# Patient Record
Sex: MALE | Race: WHITE | NOT HISPANIC OR LATINO | Employment: FULL TIME | URBAN - METROPOLITAN AREA
[De-identification: names, ages, dates, MRNs, and addresses within clinical notes are randomized per-mention and may not be internally consistent; named-entity substitution may affect disease eponyms.]

---

## 2017-05-15 ENCOUNTER — HOSPITAL ENCOUNTER (EMERGENCY)
Facility: CLINIC | Age: 22
Discharge: HOME | End: 2017-05-15
Attending: EMERGENCY MEDICINE | Admitting: EMERGENCY MEDICINE
Payer: MEDICAID

## 2017-05-15 VITALS
RESPIRATION RATE: 15 BRPM | SYSTOLIC BLOOD PRESSURE: 114 MMHG | DIASTOLIC BLOOD PRESSURE: 66 MMHG | WEIGHT: 175 LBS | TEMPERATURE: 98.8 F | OXYGEN SATURATION: 97 %

## 2017-05-15 DIAGNOSIS — E86.0 DEHYDRATION: ICD-10-CM

## 2017-05-15 DIAGNOSIS — R00.1 SINUS BRADYCARDIA: ICD-10-CM

## 2017-05-15 DIAGNOSIS — R55 SYNCOPE, UNSPECIFIED SYNCOPE TYPE: ICD-10-CM

## 2017-05-15 LAB
ANION GAP SERPL CALCULATED.3IONS-SCNC: 7 MMOL/L (ref 3–14)
BASOPHILS # BLD AUTO: 0 10E9/L (ref 0–0.2)
BASOPHILS NFR BLD AUTO: 0 %
BUN SERPL-MCNC: 11 MG/DL (ref 7–30)
CALCIUM SERPL-MCNC: 8.9 MG/DL (ref 8.5–10.1)
CHLORIDE SERPL-SCNC: 108 MMOL/L (ref 94–109)
CO2 SERPL-SCNC: 27 MMOL/L (ref 20–32)
CREAT SERPL-MCNC: 0.85 MG/DL (ref 0.66–1.25)
DIFFERENTIAL METHOD BLD: NORMAL
EOSINOPHIL # BLD AUTO: 0.1 10E9/L (ref 0–0.7)
EOSINOPHIL NFR BLD AUTO: 1 %
ERYTHROCYTE [DISTWIDTH] IN BLOOD BY AUTOMATED COUNT: 13.4 % (ref 10–15)
GFR SERPL CREATININE-BSD FRML MDRD: NORMAL ML/MIN/1.7M2
GLUCOSE SERPL-MCNC: 92 MG/DL (ref 70–99)
HCT VFR BLD AUTO: 45.7 % (ref 40–53)
HGB BLD-MCNC: 15.6 G/DL (ref 13.3–17.7)
LYMPHOCYTES # BLD AUTO: 2.1 10E9/L (ref 0.8–5.3)
LYMPHOCYTES NFR BLD AUTO: 38 %
MCH RBC QN AUTO: 28.9 PG (ref 26.5–33)
MCHC RBC AUTO-ENTMCNC: 34.1 G/DL (ref 31.5–36.5)
MCV RBC AUTO: 85 FL (ref 78–100)
MONOCYTES # BLD AUTO: 0.7 10E9/L (ref 0–1.3)
MONOCYTES NFR BLD AUTO: 13 %
NEUTROPHILS # BLD AUTO: 2.6 10E9/L (ref 1.6–8.3)
NEUTROPHILS NFR BLD AUTO: 48 %
PLATELET # BLD AUTO: 195 10E9/L (ref 150–450)
POTASSIUM SERPL-SCNC: 4 MMOL/L (ref 3.4–5.3)
RBC # BLD AUTO: 5.39 10E12/L (ref 4.4–5.9)
SODIUM SERPL-SCNC: 142 MMOL/L (ref 133–144)
WBC # BLD AUTO: 5.4 10E9/L (ref 4–11)

## 2017-05-15 PROCEDURE — 80048 BASIC METABOLIC PNL TOTAL CA: CPT | Performed by: EMERGENCY MEDICINE

## 2017-05-15 PROCEDURE — 96360 HYDRATION IV INFUSION INIT: CPT

## 2017-05-15 PROCEDURE — 96361 HYDRATE IV INFUSION ADD-ON: CPT

## 2017-05-15 PROCEDURE — 25000128 H RX IP 250 OP 636: Performed by: EMERGENCY MEDICINE

## 2017-05-15 PROCEDURE — 99284 EMERGENCY DEPT VISIT MOD MDM: CPT | Mod: 25 | Performed by: EMERGENCY MEDICINE

## 2017-05-15 PROCEDURE — 99284 EMERGENCY DEPT VISIT MOD MDM: CPT | Mod: 25

## 2017-05-15 PROCEDURE — 93010 ELECTROCARDIOGRAM REPORT: CPT | Performed by: EMERGENCY MEDICINE

## 2017-05-15 PROCEDURE — 93005 ELECTROCARDIOGRAM TRACING: CPT

## 2017-05-15 PROCEDURE — 85025 COMPLETE CBC W/AUTO DIFF WBC: CPT | Performed by: EMERGENCY MEDICINE

## 2017-05-15 RX ORDER — SODIUM CHLORIDE 9 MG/ML
1000 INJECTION, SOLUTION INTRAVENOUS CONTINUOUS
Status: DISCONTINUED | OUTPATIENT
Start: 2017-05-15 | End: 2017-05-15 | Stop reason: HOSPADM

## 2017-05-15 RX ADMIN — SODIUM CHLORIDE 1500 ML: 9 INJECTION, SOLUTION INTRAVENOUS at 10:44

## 2017-05-15 ASSESSMENT — ENCOUNTER SYMPTOMS: LIGHT-HEADEDNESS: 1

## 2017-05-15 NOTE — DISCHARGE INSTRUCTIONS
Encourage rehydration with water and Gatorade  Call to set up outpatient cardiac monitoring.  Order has been placed for 24-hour Holter monitor  Appointment has been scheduled Prabhu Rodriguez MD-Wednesday, May 24 7:20 AM-[facility].

## 2017-05-15 NOTE — ED AVS SNAPSHOT
[facility]    [address] [address]    Phone:  [phone]    Fax:  [phone]                                       Chet Byrd   MRN: [id number]    Department:  [facility]   Date of Visit:  5/15/2017           Patient Information     Date Of Birth          1995        Your diagnoses for this visit were:     Syncope, unspecified syncope type     Dehydration     Sinus bradycardia        You were seen by Antonio Hernandez DO.      Follow-up Information     Follow up with Prabhu Rodriguez MD.    Specialty:  Family Practice    Contact information:    [facility]  [address] [address]  [phone]          Discharge Instructions       Encourage rehydration with water and Gatorade  Call to set up outpatient cardiac monitoring.  Order has been placed for 24-hour Holter monitor  Appointment has been scheduled Prabhu Rodriguez MD-Wednesday, May 24 7:20 AM-[facility] family medicine clinic.    Future Appointments        Provider Department Dept Phone Center    5/17/2017 9:00 AM [place] [facility] Services [phone] [facility]    5/24/2017 7:20 AM Prabhu Rodriguez MD [facility] [phone] [facility]      24 Hour Appointment Hotline       To make an appointment at any [facility], call [phone] ([phone]). If you don't have a family doctor or clinic, we will help you find one. [facility] are conveniently located to serve the needs of you and your family.          ED Discharge Orders     Holter Monitor 24 hour - Adult           Holter Monitor 24 hour - Adult           Holter Monitor 24 hour - Adult                    Review of your medicines      Notice     You have not been prescribed any medications.            Procedures and tests performed during your visit     Basic metabolic panel    CBC with platelets differential    Cardiac  Continuous Monitoring    EKG 12-lead, tracing only    Glucose monitor nursing POCT    Orthostatic blood pressure and pulse    Pulse oximetry nursing      Orders Needing Specimen Collection     Ordered          05/15/17 1007  Routine UA with microscopic - ROUTINE, Prio: Routine, Needs to be Collected     Scheduled Task Status   05/15/17 1007 Collect Routine UA with microscopic Open   Order Class:  PCU Collect                  Pending Results     No orders found from 5/13/2017 to 5/16/2017.            Pending Culture Results     No orders found from 5/13/2017 to 5/16/2017.            Pending Results Instructions     If you had any lab results that were not finalized at the time of your Discharge, you can call the ED Lab Result RN at [phone]. You will be contacted by this team for any positive Lab results or changes in treatment. The nurses are available 7 days a week from 10A to 6:30P.  You can leave a message 24 hours per day and they will return your call.        Test Results From Your Hospital Stay        5/15/2017 12:10 PM      Component Results     Component Value Ref Range & Units Status    WBC 5.4 4.0 - 11.0 10e9/L Final    RBC Count 5.39 4.4 - 5.9 10e12/L Final    Hemoglobin 15.6 13.3 - 17.7 g/dL Final    Hematocrit 45.7 40.0 - 53.0 % Final    MCV 85 78 - 100 fl Final    MCH 28.9 26.5 - 33.0 pg Final    MCHC 34.1 31.5 - 36.5 g/dL Final    RDW 13.4 10.0 - 15.0 % Final    Platelet Count 195 150 - 450 10e9/L Final    Diff Method Manual Differential  Final    % Neutrophils 48.0 % Final    % Lymphocytes 38.0 % Final    % Monocytes 13.0 % Final    % Eosinophils 1.0 % Final    % Basophils 0.0 % Final    Absolute Neutrophil 2.6 1.6 - 8.3 10e9/L Final    Absolute Lymphocytes 2.1 0.8 - 5.3 10e9/L Final    Absolute Monocytes 0.7 0.0 - 1.3 10e9/L Final    Absolute Eosinophils 0.1 0.0 - 0.7 10e9/L Final    Absolute Basophils 0.0 0.0 - 0.2 10e9/L Final         5/15/2017 10:45 AM      Component Results     Component  "Value Ref Range & Units Status    Sodium 142 133 - 144 mmol/L Final    Potassium 4.0 3.4 - 5.3 mmol/L Final    Chloride 108 94 - 109 mmol/L Final    Carbon Dioxide 27 20 - 32 mmol/L Final    Anion Gap 7 3 - 14 mmol/L Final    Glucose 92 70 - 99 mg/dL Final    Urea Nitrogen 11 7 - 30 mg/dL Final    Creatinine 0.85 0.66 - 1.25 mg/dL Final    GFR Estimate >90  Non  GFR Calc   >60 mL/min/1.7m2 Final    GFR Estimate If Black >90   GFR Calc   >60 mL/min/1.7m2 Final    Calcium 8.9 8.5 - 10.1 mg/dL Final                Thank you for choosing [facility]       Thank you for choosing [facility] for your care. Our goal is always to provide you with excellent care. Hearing back from our patients is one way we can continue to improve our services. Please take a few minutes to complete the written survey that you may receive in the mail after you visit with us. Thank you!        [employer] Information     [employer] lets you send messages to your doctor, view your test results, renew your prescriptions, schedule appointments and more. To sign up, go to [email or web address] . Click on \"Log in\" on the left side of the screen, which will take you to the Welcome page. Then click on \"Sign up Now\" on the right side of the page.     You will be asked to enter the access code listed below, as well as some personal information. Please follow the directions to create your username and password.     Your access code is: [email or web address]  Expires: 2017 11:11 AM     Your access code will  in 90 days. If you need help or a new code, please call your [facility] clinic or [phone].        Care EveryWhere ID     This is your Care EveryWhere ID. This could be used by other organizations to access your [facility] medical records  [id number]        After Visit Summary       This is your record. Keep this with you and show to your community pharmacist(s) and doctor(s) at your next visit.                  "

## 2017-05-15 NOTE — ED NOTES
"Fell in shower @ 0830, hx of chronic headaches, states increased t/o morning, prior to shower. +LOC in shower, unsure if hit head. Resting HR noted to be 45-60, pt states \"usually 120 bpm\". Will order EKG. No sx of SOA or pain at this time   "

## 2017-05-15 NOTE — ED AVS SNAPSHOT
[facility]    [address] [address]    Phone:  [phone]    Fax:  [phone]                                       Chet Byrd   MRN: [id number]    Department:  [facility]   Date of Visit:  5/15/2017           After Visit Summary Signature Page     I have received my discharge instructions, and my questions have been answered. I have discussed any challenges I see with this plan with the nurse or doctor.    ..........................................................................................................................................  Patient/Patient Representative Signature      ..........................................................................................................................................  Patient Representative Print Name and Relationship to Patient    ..................................................               ................................................  Date                                            Time    ..........................................................................................................................................  Reviewed by Signature/Title    ...................................................              ..............................................  Date                                                            Time

## 2017-05-15 NOTE — ED NOTES
500cc remaining on ordered bolus. Pt to dc following completion. Called Cardiac Services in regards to holter monitor, pt will go to clinic if no call back to schedule appt

## 2017-05-15 NOTE — LETTER
[facility] EMERGENCY DEPARTMENT  [address] [address]  [phone]    May 15, 2017    Chet Byrd  [address]   [place] [address]  [phone] (home)     : 1995      To Whom it may concern:    Chet Byrd was seen in our Emergency Department today, May 15, 2017.  I expect his condition to improve over the next 2 days.  He may return to work when improved.    Sincerely,        Antonio Hernandez Dr.  [facility] ED Staff Physician

## 2017-05-15 NOTE — ED PROVIDER NOTES
History     Chief Complaint   Patient presents with     Loss of Consciousness     at 0800 in shower - no injury  - ha and nausea now     HPI  Chet Byrd is a 21 year old male who presents after a loss of consciousness. The patient presents after having a syncopal episode in the shower at 0830 this morning. Prior to his episode he felt light headed. Yesterday he felt well, but was doing yard work in the heat and had a decreased fluid intake. The patient states he always feels overly fatigued and exhausted due to long hours at work. He usually works in the early morning or late at night and doesn't have a good sleep schedule. The patient said he feels tachycardic at night but otherwise feels well during the day. He had one incident in the past after having a concussion when he tried to pour a glass of milk and he missed the cup by two and a half feet. No alcohol use. Non smoker. Occasional marijuana use.       No past medical history on file.    No past surgical history on file.    Social History   Substance Use Topics     Smoking status: Not on file     Smokeless tobacco: Not on file     Alcohol use Not on file        No Known Allergies    I have reviewed the Medications, Allergies, Past Medical and Surgical History, and Social History in the Epic system.    Review of Systems   Neurological: Positive for syncope and light-headedness.     All other systems were reviewed and are negative.     Physical Exam   BP: 120/64  Heart Rate: 52  Temp: 98.8  F (37.1  C)  Resp: 16  Weight: 79.4 kg (175 lb)  SpO2: 100 %    Physical Exam   Vital signs reviewed  Not orthostatic  Head:  Normocephalic.    Eyes:  PERRLA, full EOM.  External exams normal.    Ears:  Normal pinnae, canals, and TM's.    Nose:  Patent, without deformity.    Throat:  Moist mucous membranes without lesions, erythema, or exudate.    Neck:  Supple, without masses, lymphadenopathy or tenderness.    Respiratory:  Normal respiratory effort.  Lungs are  clear with good breath sounds.    Heart:  RR without murmurs, rubs, or gallops.    Extremities: Well perfused no edema          ED Course     ED Course     Procedures          EKG: Sinus bradycardia.  Rate 49 bpm.  No ectopy.  Normal repolarization.  No old EKG for comparison  Interpretation completed by me  Impressions: Heart sounds bradycardia(not on any rate limiting medications)       Results for orders placed or performed during the hospital encounter of 05/15/17   CBC with platelets differential   Result Value Ref Range    WBC Pending 4.0 - 11.0 10e9/L    RBC Count 5.39 4.4 - 5.9 10e12/L    Hemoglobin 15.6 13.3 - 17.7 g/dL    Hematocrit 45.7 40.0 - 53.0 %    MCV 85 78 - 100 fl    MCH 28.9 26.5 - 33.0 pg    MCHC 34.1 31.5 - 36.5 g/dL    RDW 13.4 10.0 - 15.0 %    Platelet Count 195 150 - 450 10e9/L    Diff Method Pending    Basic metabolic panel   Result Value Ref Range    Sodium 142 133 - 144 mmol/L    Potassium 4.0 3.4 - 5.3 mmol/L    Chloride 108 94 - 109 mmol/L    Carbon Dioxide 27 20 - 32 mmol/L    Anion Gap 7 3 - 14 mmol/L    Glucose 92 70 - 99 mg/dL    Urea Nitrogen 11 7 - 30 mg/dL    Creatinine 0.85 0.66 - 1.25 mg/dL    GFR Estimate >90  Non  GFR Calc   >60 mL/min/1.7m2    GFR Estimate If Black >90   GFR Calc   >60 mL/min/1.7m2    Calcium 8.9 8.5 - 10.1 mg/dL               10:29 AM patient assessed.     Assessments & Plan (with Medical Decision Making)  21-year-old male who presents following syncope in the shower.  Appears fluid volume down/dehydrated from working in the sun and heat yesterday.  In addition noted to have sinus bradycardia.  Not any medications that would cause bradycardia.  Treatment plan: IV rehydration.  Set up outpatient for Holter monitor.  Arrangements for follow up appointment with Prabhu Rodriguez MD- scheduled for May 24 7:20 AM      I have reviewed the nursing notes.    I have reviewed the findings, diagnosis, plan and need for follow up with the  patient.    New Prescriptions    No medications on file       Final diagnoses:   Syncope, unspecified syncope type   Dehydration   Sinus bradycardia     This document serves as a record of the services and decisions personally performed and made by Antonio Hernandez, *. It was created on HIS/HER behalf by Angelica Pritchett, a trained medical scribe. The creation of this document is based the provider's statements to the medical scribe.  Angelica Pritchett 10:28 AM 5/15/2017    Provider:   The information in this document, created by the medical scribe for me, accurately reflects the services I personally performed and the decisions made by me. I have reviewed and approved this document for accuracy prior to leaving the patient care area.  Antonio Hernandez, * 10:28 AM 5/15/2017      5/15/2017   [facility] EMERGENCY DEPARTMENT     Antonio Hernandez, DO  05/15/17 1109

## 2017-05-17 ENCOUNTER — HOSPITAL ENCOUNTER (OUTPATIENT)
Dept: CARDIOLOGY | Facility: CLINIC | Age: 22
Discharge: HOME | End: 2017-05-17
Attending: EMERGENCY MEDICINE | Admitting: EMERGENCY MEDICINE
Payer: MEDICAID

## 2017-05-17 DIAGNOSIS — R55 SYNCOPE, UNSPECIFIED SYNCOPE TYPE: ICD-10-CM

## 2017-05-17 DIAGNOSIS — R00.1 SINUS BRADYCARDIA: ICD-10-CM

## 2017-05-17 PROCEDURE — 93227 XTRNL ECG REC<48 HR R&I: CPT | Performed by: INTERNAL MEDICINE

## 2017-05-17 PROCEDURE — 93225 XTRNL ECG REC<48 HRS REC: CPT | Performed by: EMERGENCY MEDICINE

## 2017-05-24 ENCOUNTER — OFFICE VISIT (OUTPATIENT)
Dept: FAMILY MEDICINE | Facility: CLINIC | Age: 22
End: 2017-05-24
Payer: MEDICAID

## 2017-05-24 VITALS
WEIGHT: 176 LBS | HEIGHT: 68 IN | SYSTOLIC BLOOD PRESSURE: 125 MMHG | TEMPERATURE: 97.5 F | BODY MASS INDEX: 26.67 KG/M2 | DIASTOLIC BLOOD PRESSURE: 69 MMHG | HEART RATE: 48 BPM

## 2017-05-24 DIAGNOSIS — R00.1 SINUS BRADYCARDIA: ICD-10-CM

## 2017-05-24 DIAGNOSIS — R55 SYNCOPE, UNSPECIFIED SYNCOPE TYPE: Primary | ICD-10-CM

## 2017-05-24 LAB
T4 FREE SERPL-MCNC: 0.84 NG/DL (ref 0.76–1.46)
TSH SERPL DL<=0.05 MIU/L-ACNC: 1.76 MU/L (ref 0.4–4)

## 2017-05-24 PROCEDURE — 36415 COLL VENOUS BLD VENIPUNCTURE: CPT | Performed by: FAMILY MEDICINE

## 2017-05-24 PROCEDURE — 84439 ASSAY OF FREE THYROXINE: CPT | Performed by: FAMILY MEDICINE

## 2017-05-24 PROCEDURE — 84443 ASSAY THYROID STIM HORMONE: CPT | Performed by: FAMILY MEDICINE

## 2017-05-24 PROCEDURE — 99214 OFFICE O/P EST MOD 30 MIN: CPT | Performed by: FAMILY MEDICINE

## 2017-05-24 NOTE — PROGRESS NOTES
"  SUBJECTIVE:                                                    Chet Byrd is a 21 year old male who presents to clinic today for the following health issues:      ED/UC Followup:    Facility:  [facility]  Date of visit: 5-15-17  Reason for visit: ER NOTE IS COPIED BELOW:  HPI  Chet Byrd is a 21 year old male who presents after a loss of consciousness. The patient presents after having a syncopal episode in the shower at 0830 this morning. Prior to his episode he felt light headed. Yesterday he felt well, but was doing yard work in the heat and had a decreased fluid intake. The patient states he always feels overly fatigued and exhausted due to long hours at work. He usually works in the early morning or late at night and doesn't have a good sleep schedule. The patient said he feels tachycardic at night but otherwise feels well during the day. He had one incident in the past after having a concussion when he tried to pour a glass of milk and he missed the cup by two and a half feet. No alcohol use. Non smoker. Occasional marijuana use.     Current Status: He had a Holter Monitor completed on 5-17-17.  He states he has been having some ongoing symptoms while at work.  He works around ovens/heat.     PULSE:  Pulse in clinic today is 48. He had a resting pulse if 42 in the ED.   His fiancee, Noelle, heard him fall and went immediately to him and there was no seizure activity.   He was awake right after this happened and not drowsy.     Family history: none for bradycardia. MGF may have a pacemaker.     No current outpatient prescriptions on file.    There is no problem list on file for this patient.      Blood pressure 125/69, pulse (!) 48, temperature 97.5  F (36.4  C), temperature source Tympanic, height 5' 8.25\" (1.734 m), weight 176 lb (79.8 kg).    Exam:  GENERAL APPEARANCE: healthy, alert and no distress  EYES: EOMI,  PERRL  NECK: no adenopathy, no asymmetry, masses, or scars and thyroid normal to " palpation  RESP: lungs clear to auscultation - no rales, rhonchi or wheezes  CV: normal S1 S2, no S3 or S4, no murmur, click or rub, no irregular beats and bradycardia  SKIN: no suspicious lesions or rashes  PSYCH: mentation appears normal and affect normal/bright      (R55) Syncope, unspecified syncope type  (primary encounter diagnosis)  Comment:   Plan: CARDIOLOGY EVAL ADULT REFERRAL, T4 free, TSH        We discussed the safety issues and avoid ladders and heights and swimming. Be very careful driving. The Cardiology referral is done.   See them. Do the thyroid labs today and we will call the results. If all these are negative then Neurological evaluation would be the next testing.     (R00.1) Sinus bradycardia  Comment:   Plan: CARDIOLOGY EVAL ADULT REFERRAL, T4 free, TSH        We discussed monitoring the pulse and the normal range at rest is between  per minute. Avoid cold medications and caffeine and diet pills and energy drinks.   Exercise is recommended. See above for the Cardiology referral and go there now or call [phone].       Prabhu Rodriguez

## 2017-05-24 NOTE — MR AVS SNAPSHOT
After Visit Summary   5/24/2017    Chet Byrd    MRN: [id number]           Patient Information     Date Of Birth          1995        Visit Information        Provider Department      5/24/2017 7:20 AM Prabhu Rodriguez MD [facility]        Today's Diagnoses     Syncope, unspecified syncope type    -  1    Sinus bradycardia          Care Instructions          Thank you for choosing [facility].  You may be receiving a survey in the mail from [facility] regarding your visit today.  Please take a few minutes to complete and return the survey to let us know how we are doing.      If you have questions or concerns, please contact us via "[employer] or you can contact your care team at [phone].    Our Clinic hours are:  Monday 6:40 am  to 7:00 pm  Tuesday -Friday 6:40 am to 5:00 pm    The [place] outpatient lab hours are:  Monday - Friday 6:10 am to 4:45 pm  Saturdays 7:00 am to 11:00 am  Appointments are required, call [phone]    If you have clinical questions after hours or would like to schedule an appointment,  call the clinic at [phone].            Follow-ups after your visit        Additional Services     CARDIOLOGY EVAL ADULT REFERRAL       Your provider has referred you to:  [facility], [phone].     Please be aware that coverage of these services is subject to the terms and limitations of your health insurance plan.  Call member services at your health plan with any benefit or coverage questions.      Type of Referral:  New Cardiology Consult    Timeframe requested:  Less than 1 week    Please bring the following to your appointment:  >>   Any x-rays, CTs or MRIs which have been performed.  Contact the facility where they were done to arrange for  prior to your scheduled appointment.    >>   List of current medications  >>   This referral request   >>   Any documents/labs given to you for this referral                  Who to  "contact     If you have questions or need follow up information about today's clinic visit or your schedule please contact [facility] directly at [phone].  Normal or non-critical lab and imaging results will be communicated to you by MyChart, letter or phone within 4 business days after the clinic has received the results. If you do not hear from us within 7 days, please contact the clinic through MyChart or phone. If you have a critical or abnormal lab result, we will notify you by phone as soon as possible.  Submit refill requests through [employer] or call your pharmacy and they will forward the refill request to us. Please allow 3 business days for your refill to be completed.          Additional Information About Your Visit        [employer] Information     [employer] lets you send messages to your doctor, view your test results, renew your prescriptions, schedule appointments and more. To sign up, go to [email or web address] . Click on \"Log in\" on the left side of the screen, which will take you to the Welcome page. Then click on \"Sign up Now\" on the right side of the page.     You will be asked to enter the access code listed below, as well as some personal information. Please follow the directions to create your username and password.     Your access code is: [email or web address]  Expires: 2017 11:11 AM     Your access code will  in 90 days. If you need help or a new code, please call your [facility] clinic or [phone].        Care EveryWhere ID     This is your Care EveryWhere ID. This could be used by other organizations to access your [facility] medical records  [id number]        Your Vitals Were     Pulse Temperature Height BMI (Body Mass Index)          48 97.5  F (36.4  C) (Tympanic) 5' 8.25\" (1.734 m) 26.57 kg/m2         Blood Pressure from Last 3 Encounters:   17 125/69   05/15/17 114/66    Weight from Last 3 Encounters:   17 176 lb (79.8 kg)   05/15/17 175 lb (79.4 kg) "              We Performed the Following     CARDIOLOGY EVAL ADULT REFERRAL     T4 free     TSH        Primary Care Provider    None Specified       No primary provider on file.        Thank you!     Thank you for choosing [facility]  for your care. Our goal is always to provide you with excellent care. Hearing back from our patients is one way we can continue to improve our services. Please take a few minutes to complete the written survey that you may receive in the mail after your visit with us. Thank you!             Your Updated Medication List - Protect others around you: Learn how to safely use, store and throw away your medicines at www.disposemymeds.org.      Notice  As of 5/24/2017  8:05 AM    You have not been prescribed any medications.

## 2017-05-24 NOTE — LETTER
[facility]  [address] [address]  Phone: [phone]    May 24, 2017    Chet Byrd  [address]   [place] [address]              Dear Mr. yBrd,        The results of your recent lab tests were within normal limits. Enclosed is a copy of these results.  If you have any further questions or problems, please contact our office.          Sincerely,      Prabhu Rodriguez MD / ac      Component      Latest Ref Rng & Units 5/24/2017   T4 Free      0.76 - 1.46 ng/dL 0.84   TSH      0.40 - 4.00 mU/L 1.76

## 2017-05-24 NOTE — PATIENT INSTRUCTIONS
Thank you for choosing [facility].  You may be receiving a survey in the mail from [facility] regarding your visit today.  Please take a few minutes to complete and return the survey to let us know how we are doing.      If you have questions or concerns, please contact us via [employer] or you can contact your care team at [phone].    Our Clinic hours are:  Monday 6:40 am  to 7:00 pm  Tuesday -Friday 6:40 am to 5:00 pm    The [place] outpatient lab hours are:  Monday - Friday 6:10 am to 4:45 pm  Saturdays 7:00 am to 11:00 am  Appointments are required, call [phone]    If you have clinical questions after hours or would like to schedule an appointment,  call the clinic at [phone].

## 2017-05-24 NOTE — NURSING NOTE
"Chief Complaint   Patient presents with     ER F/U     Follow up from the ER on 5-15-17.       Initial /69  Pulse (!) 48  Temp 97.5  F (36.4  C) (Tympanic)  Ht 5' 8.25\" (1.734 m)  Wt 176 lb (79.8 kg)  BMI 26.57 kg/m2 Estimated body mass index is 26.57 kg/(m^2) as calculated from the following:    Height as of this encounter: 5' 8.25\" (1.734 m).    Weight as of this encounter: 176 lb (79.8 kg).  Medication Reconciliation: complete  "

## 2017-06-05 ENCOUNTER — OFFICE VISIT (OUTPATIENT)
Dept: CARDIOLOGY | Facility: CLINIC | Age: 22
End: 2017-06-05
Payer: MEDICAID

## 2017-06-05 VITALS
DIASTOLIC BLOOD PRESSURE: 53 MMHG | SYSTOLIC BLOOD PRESSURE: 105 MMHG | WEIGHT: 181 LBS | OXYGEN SATURATION: 96 % | BODY MASS INDEX: 27.32 KG/M2 | HEART RATE: 107 BPM

## 2017-06-05 DIAGNOSIS — R00.1 SINUS BRADYCARDIA: Primary | ICD-10-CM

## 2017-06-05 PROCEDURE — 99204 OFFICE O/P NEW MOD 45 MIN: CPT | Performed by: INTERNAL MEDICINE

## 2017-06-05 NOTE — LETTER
"6/5/2017    Prabhu Rodriguez MD  [address] [place] [address]    RE: Chet Byrd       Dear Colleague,    I had the pleasure of seeing Chet Byrd in the [facility].    I had the pleasure to follow up with your patient, Mr. Chet Byrd, in the Cardiovascular Medicine Clinic.  As you recall, this is a gentleman who is 21 years old.  He is accompanied by his family members.  He had presented to the emergency room on 05/15/2017.  At that point, he had some episode of loss of consciousness.  It was unclear how long this was.  He states that the day started out as usual.  He was in the shower the morning at 8:30, he felt a little lightheaded prior to this, and the day prior he had felt well but was doing yard work in the heat and stated that he had decreased fluid intake.  He was in the shower and felt lightheaded and fell, did not injure himself and called out to his family members.  He was immediately evaluated.  He was oriented to person, place and time.  There was no seizure-like activity noted, and patient was coherent throughout this timeframe.  He was taken via personal car to the emergency room and was evaluated and felt to be dehydrated based on lab work, was given IV fluids.  EKG was performed.  This was within normal limits.  He also had lab work performed.  This was also within normal limits including a TSH.  Holter monitor was placed on him, and this was for 24 hours and did not show any significant findings.  He did have some sinus bradycardia on the EKG.  He presents for an evaluation in our Cardiology Clinic.  As a child growing up, he stated that he did not have any chronic medical issues.  He has always been active and has kept up with his age-related peers.  He has played sports and was always in the \"front of the pack.\"  No prior episodes and no episodes since that timeframe.      PAST MEDICAL HISTORY:   1.  Likely vasovagal syncope with " dehydration.   2.  Normal Holter monitor.   3.  EKG was sinus bradycardia.      MEDICATIONS:  Ibuprofen p.r.n.      ALLERGIES:  No known drug allergies.      SOCIAL HISTORY:  Nonsmoker.  Does not use any energy drinks.  Lives with family.  Works as a , has a strenuous job.      FAMILY HISTORY:  Noncontributory and negative for premature coronary artery disease, syncope or any other conduction-related issues or pacemakers in family.      REVIEW OF SYSTEMS:  Comprehensive 10-point review of systems was negative otherwise as specified in the HPI.      PHYSICAL EXAMINATION:   VITAL SIGNS:  Blood pressure is 105/53.  Heart rate increased from 71 beats per minute to 105 beats per minute with limited exercise.  Weight is 181 pounds.   GENERAL:  The patient is alert, oriented, in no apparent distress.   HEENT:  Oropharynx clear, no sinus tenderness.   NECK:  No JVP, no lymphadenopathy, no carotid bruits.   CARDIOVASCULAR:  Distant heart tones.  Normal S1, S2.   LUNGS:  Coarse but clear.   ABDOMEN:  Soft, nontender, nondistended.   EXTREMITIES:  No clubbing, cyanosis or edema.      LABORATORY DATA:  Reviewed.  Please see chart for details.  Normal TSH, normal chemistry panel.      RECOMMENDATIONS:   1.  Likely simple faint, vasovagal with dehydration.   2.  Normal Holter monitor.   3.  Sinus bradycardia with normal heart rate response to limited activity.      RECOMMENDATIONS:  The patient is evaluated, likely had dehydration as a precipitating factor in his presentation to the emergency room.  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  He states that he will go ahead and comply with this.  He had a good response with limited exercise, suggesting no issues or evidence of chronotropic incompetence regarding his heart rate.  His EKG has been reviewed, and we will ask that he return to clinic on an as-needed basis.      It was a  pleasure seeing him on your behalf.      Sincerely,    Shayan Gilbert MD     [facility]

## 2017-06-05 NOTE — MR AVS SNAPSHOT
"              After Visit Summary   2017    Chet Byrd    MRN: [id number]           Patient Information     Date Of Birth          1995        Visit Information        Provider Department      2017 2:00 PM Shayan Gilbert MD [facility]        Today's Diagnoses     Sinus bradycardia    -  1       Follow-ups after your visit        Who to contact     If you have questions or need follow up information about today's clinic visit or your schedule please contact [facility] directly at [phone].  Normal or non-critical lab and imaging results will be communicated to you by Altheoshart, letter or phone within 4 business days after the clinic has received the results. If you do not hear from us within 7 days, please contact the clinic through mechatronic systemtechnikt or phone. If you have a critical or abnormal lab result, we will notify you by phone as soon as possible.  Submit refill requests through Apprion or call your pharmacy and they will forward the refill request to us. Please allow 3 business days for your refill to be completed.          Additional Information About Your Visit        MyChart Information     Apprion lets you send messages to your doctor, view your test results, renew your prescriptions, schedule appointments and more. To sign up, go to [email or web address] . Click on \"Log in\" on the left side of the screen, which will take you to the Welcome page. Then click on \"Sign up Now\" on the right side of the page.     You will be asked to enter the access code listed below, as well as some personal information. Please follow the directions to create your username and password.     Your access code is: [email or web address]  Expires: 2017 11:11 AM     Your access code will  in 90 days. If you need help or a new code, please call your [facility] clinic or [phone].        Care EveryWhere ID     " This is your Care EveryWhere ID. This could be used by other organizations to access your [facility] medical records  [id number]        Your Vitals Were     Pulse Pulse Oximetry BMI (Body Mass Index)             107 96% 27.32 kg/m2          Blood Pressure from Last 3 Encounters:   06/05/17 105/53   05/24/17 125/69   05/15/17 114/66    Weight from Last 3 Encounters:   06/05/17 82.1 kg (181 lb)   05/24/17 79.8 kg (176 lb)   05/15/17 79.4 kg (175 lb)              Today, you had the following     No orders found for display       Primary Care Provider    None Specified       No primary provider on file.        Thank you!     Thank you for choosing [facility]  for your care. Our goal is always to provide you with excellent care. Hearing back from our patients is one way we can continue to improve our services. Please take a few minutes to complete the written survey that you may receive in the mail after your visit with us. Thank you!             Your Updated Medication List - Protect others around you: Learn how to safely use, store and throw away your medicines at www.disposemymeds.org.          This list is accurate as of: 6/5/17  2:25 PM.  Always use your most recent med list.                   Brand Name Dispense Instructions for use    IBUPROFEN PO      Take by mouth as needed for moderate pain       TYLENOL PO      Take by mouth as needed for mild pain or fever

## 2017-06-06 NOTE — PROGRESS NOTES
"HISTORY OF PRESENT ILLNESS:  I had the pleasure to follow up with your patient, Mr. Chet Byrd, in the Cardiovascular Medicine Clinic.  As you recall, this is a gentleman who is 21 years old.  He is accompanied by his family members.  He had presented to the emergency room on 05/15/2017.  At that point, he had some episode of loss of consciousness.  It was unclear how long this was.  He states that the day started out as usual.  He was in the shower the morning at 8:30, he felt a little lightheaded prior to this, and the day prior he had felt well but was doing yard work in the heat and stated that he had decreased fluid intake.  He was in the shower and felt lightheaded and fell, did not injure himself and called out to his family members.  He was immediately evaluated.  He was oriented to person, place and time.  There was no seizure-like activity noted, and patient was coherent throughout this timeframe.  He was taken via personal car to the emergency room and was evaluated and felt to be dehydrated based on lab work, was given IV fluids.  EKG was performed.  This was within normal limits.  He also had lab work performed.  This was also within normal limits including a TSH.  Holter monitor was placed on him, and this was for 24 hours and did not show any significant findings.  He did have some sinus bradycardia on the EKG.  He presents for an evaluation in our Cardiology Clinic.  As a child growing up, he stated that he did not have any chronic medical issues.  He has always been active and has kept up with his age-related peers.  He has played sports and was always in the \"front of the pack.\"  No prior episodes and no episodes since that timeframe.      PAST MEDICAL HISTORY:   1.  Likely vasovagal syncope with dehydration.   2.  Normal Holter monitor.   3.  EKG was sinus bradycardia.      MEDICATIONS:  Ibuprofen p.r.n.      ALLERGIES:  No known drug allergies.      SOCIAL HISTORY:  Nonsmoker.  Does not use " any energy drinks.  Lives with family.  Works as a , has a strenuous job.      FAMILY HISTORY:  Noncontributory and negative for premature coronary artery disease, syncope or any other conduction-related issues or pacemakers in family.      REVIEW OF SYSTEMS:  Comprehensive 10-point review of systems was negative otherwise as specified in the HPI.      PHYSICAL EXAMINATION:   VITAL SIGNS:  Blood pressure is 105/53.  Heart rate increased from 71 beats per minute to 105 beats per minute with limited exercise.  Weight is 181 pounds.   GENERAL:  The patient is alert, oriented, in no apparent distress.   HEENT:  Oropharynx clear, no sinus tenderness.   NECK:  No JVP, no lymphadenopathy, no carotid bruits.   CARDIOVASCULAR:  Distant heart tones.  Normal S1, S2.   LUNGS:  Coarse but clear.   ABDOMEN:  Soft, nontender, nondistended.   EXTREMITIES:  No clubbing, cyanosis or edema.      LABORATORY DATA:  Reviewed.  Please see chart for details.  Normal TSH, normal chemistry panel.      RECOMMENDATIONS:   1.  Likely simple faint, vasovagal with dehydration.   2.  Normal Holter monitor.   3.  Sinus bradycardia with normal heart rate response to limited activity.      RECOMMENDATIONS:  The patient is evaluated, likely had dehydration as a precipitating factor in his presentation to the emergency room.  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  He states that he will go ahead and comply with this.  He had a good response with limited exercise, suggesting no issues or evidence of chronotropic incompetence regarding his heart rate.  His EKG has been reviewed, and we will ask that he return to clinic on an as-needed basis.      It was a pleasure seeing him on your behalf.      cc:   Prabhu Rodriguez MD    [facility]    [address] [place]  [address]         JACQUELYN ARAYA MD             D:  2017 14:24   T: 2017 23:24   MT: SHEYLA      Name:     SHMUEL LEVIN   MRN:      [id number]        Account:      [id number]   :      1995           Service Date: 2017      Document: [id number]

## 2017-10-02 ENCOUNTER — OFFICE VISIT (OUTPATIENT)
Dept: FAMILY MEDICINE | Facility: CLINIC | Age: 22
End: 2017-10-02
Payer: COMMERCIAL

## 2017-10-02 VITALS
HEART RATE: 57 BPM | WEIGHT: 172.2 LBS | TEMPERATURE: 97.5 F | HEIGHT: 68 IN | DIASTOLIC BLOOD PRESSURE: 76 MMHG | SYSTOLIC BLOOD PRESSURE: 126 MMHG | BODY MASS INDEX: 26.1 KG/M2

## 2017-10-02 DIAGNOSIS — B02.9 HERPES ZOSTER WITHOUT COMPLICATION: Primary | ICD-10-CM

## 2017-10-02 PROCEDURE — 99213 OFFICE O/P EST LOW 20 MIN: CPT | Performed by: FAMILY MEDICINE

## 2017-10-02 RX ORDER — VALACYCLOVIR HYDROCHLORIDE 1 G/1
1000 TABLET, FILM COATED ORAL 3 TIMES DAILY
Qty: 21 TABLET | Refills: 0 | Status: SHIPPED | OUTPATIENT
Start: 2017-10-02

## 2017-10-02 RX ORDER — HYDROCODONE BITARTRATE AND ACETAMINOPHEN 5; 325 MG/1; MG/1
1 TABLET ORAL EVERY 6 HOURS PRN
Qty: 20 TABLET | Refills: 0 | Status: SHIPPED | OUTPATIENT
Start: 2017-10-02 | End: 2017-12-19

## 2017-10-02 NOTE — PATIENT INSTRUCTIONS
Start valacyclovir as prescribed.  Take Hydrocodone-Acetaminophen 5/325 mg 1 tablet orally every 6 hrs as needed for severe pain only.  Do not take Norco when driving, when at work,  or when operating heavy equipment.    You may receive your flu shot in 2-3 weeks.    If with increasing pain after next several days, see provider again.    Avoid activities that increase pain of the rash.    Avoid close prolonged contact with your two-month old daughter until the rashes completely dry out and no new rashes appear.      Shingles  Shingles is a viral infection caused by the same virus as chicken pox. Anyone who has had chicken pox may get shingles later in life. The virus stays in the body, but remains dormant (asleep). Shingles often occurs in older persons or persons with lowered immunity. But it can affect anyone at any age.  Shingles starts as a tingling patch of skin on one side of the body. Small, painful blisters may then appear. The rash does not spread to the rest of the body.  Exposure to shingles cannot cause shingles. However, it can cause chicken pox in anyone who has not had chicken pox or has not been vaccinated. The contagious period ends when all blisters have crusted over (generally about 2 weeks after the illness begins).  After the blisters heal, the affected skin may be sensitive or painful for months (neuralgia). This often gradually goes away.  A shingles vaccine is available. This can help prevent shingles or make it less painful. It is generally recommended for adults over the age of 60 who have had chicken pox in the past, but who have never had shingles. Adults over 60 who have had neither chicken pox nor shingles can prevent both diseases with the chicken pox vaccine. Ask your healthcare provider about these vaccines.  Home care    Medicines may be prescribed to help relieve pain. Take these medicines as directed. Ask your healthcare provider or pharmacist before using over-the-counter  medicines for helping treat pain and itching.    In certain cases, antiviral medicines may be prescribed to reduce pain, shorten the illness, and prevent neuralgia. Take these medicines as directed.    Compresses made from a solution of cool water mixed with cornstarch or baking soda may help relieve pain and itching.     Gently wash skin daily with soap and water to help prevent infection.  Be certain to rinse off all of the soap, which can be irritating.    Trim fingernails and try not to scratch. Scratching the sores may leave scars.    Stay home from work or school until all blisters have formed a crust and you are no longer contagious.  Follow-up care  Follow up with your healthcare provider or as directed by our staff.  When to seek medical advice    Fever of 100.4 F (38 C) or higher, or as directed by your healthcare provider    Affected skin is on the face or neck and any of the following occur:    Headache    Eye pain    Changes in vision    Sores near the eye    Weakness of facial muscles    Pain, redness, or swelling of a joint    Signs of skin infection: colored drainage from the sores, warmth, increasing redness, or increasing pain  Date Last Reviewed: 9/25/2015 2000-2017 [employer]. [address] [place] [address], [place] [address]. All rights reserved. This information is not intended as a substitute for professional medical care. Always follow your healthcare professional's instructions.

## 2017-10-02 NOTE — NURSING NOTE
"Chief Complaint   Patient presents with     Derm Problem     Pt has had rash under left armpit for past 3-4 days.       Initial /76  Pulse 57  Temp 97.5  F (36.4  C) (Tympanic)  Ht 5' 8.25\" (1.734 m)  Wt 172 lb 3.2 oz (78.1 kg)  BMI 25.99 kg/m2 Estimated body mass index is 25.99 kg/(m^2) as calculated from the following:    Height as of this encounter: 5' 8.25\" (1.734 m).    Weight as of this encounter: 172 lb 3.2 oz (78.1 kg).  Medication Reconciliation: complete  Rere Pena CMA    "

## 2017-10-02 NOTE — PROGRESS NOTES
SUBJECTIVE:   Chet Byrd is a 22 year old male who presents to clinic today for the following health issues:  Chief Complaint   Patient presents with     Derm Problem     Pt has had rash under left armpit for past 3-4 days.     Flu Shot     will wait due to immune system being down         Rash  Onset: 3-4 days ago    Description:   Location: under left armpit  Character: raised, painful, burning, red  Itching (Pruritis): YES  2 days ago, rash was ligher in color and not as many - in 2 days the rash spread along the left axilla into medial upper arm and adjacent lateral chest.    Progression of Symptoms:  worsening and constant    Accompanying Signs & Symptoms:  Fever: no   Body aches or joint pain: no   Sore throat symptoms: YES- pt recently had cold symptoms, st  Recent cold symptoms: YES, feels better today    History:   Previous similar rash: no     Precipitating factors:   Exposure to similar rash: no   New exposures: None   Recent travel: no     Alleviating factors:  none    Therapies Tried and outcome: none  Verified above history with patient.    Patient denies sick contacts with rash.    Patient works in a moving company.    Patient did report mild cold symptoms few days prior to rash.    Patient reports he had chicken pox when he was younger.      Problem list and histories reviewed & adjusted, as indicated.  Additional history: as documented    Patient Active Problem List   Diagnosis     Syncope, unspecified syncope type     Sinus bradycardia     History reviewed. No pertinent surgical history.    Social History   Substance Use Topics     Smoking status: Current Every Day Smoker     Packs/day: 0.50     Years: 8.00     Types: Cigarettes     Smokeless tobacco: Never Used     Alcohol use No      Comment: None as of lately.  Previous use.  5-24-17 visit.     Family History   Problem Relation Age of Onset     DIABETES Mother      DIABETES Father      Coronary Artery Disease Maternal Grandfather       "Multiple stents.     Brain Tumor Maternal Grandfather          Current Outpatient Prescriptions   Medication Sig Dispense Refill     valACYclovir (VALTREX) 1000 mg tablet Take 1 tablet (1,000 mg) by mouth 3 times daily 21 tablet 0     HYDROcodone-acetaminophen (NORCO) 5-325 MG per tablet Take 1 tablet by mouth every 6 hours as needed for moderate to severe pain 20 tablet 0     No Known Allergies      Reviewed and updated as needed this visit by clinical staffTobacco  Allergies  Meds  Problems  Med Hx  Surg Hx  Fam Hx  Soc Hx        Reviewed and updated as needed this visit by Provider  Allergies  Meds  Problems         ROS:  C: NEGATIVE for fever, chills, change in weight  INTEGUMENTARY/SKIN: see above  MUSCULOSKELETAL: see above  H: NEGATIVE for bleeding problems    OBJECTIVE:                                                    /76  Pulse 57  Temp 97.5  F (36.4  C) (Tympanic)  Ht 5' 8.25\" (1.734 m)  Wt 172 lb 3.2 oz (78.1 kg)  BMI 25.99 kg/m2  Body mass index is 25.99 kg/(m^2).  GEN: alert, oriented x 3, NAD  SKIN: good turgor; large ovoid cluster of vesicles with erythematous haloes, many have been unroofed on left axilla extending to proximal medial upper arm and few inches in to the adjacent thoracic skin; no other rash  Diagnostic test results:  Diagnostic Test Results:  none        ASSESSMENT/PLAN:                                                        ICD-10-CM    1. Herpes zoster without complication B02.9 valACYclovir (VALTREX) 1000 mg tablet     HYDROcodone-acetaminophen (NORCO) 5-325 MG per tablet     Highly suspicious for zoster, but can also be HSV.  Since recent onset, patient will start antiviral. He concurs.  Discussed role of med and possible side effects.  Skin care discussed with patient.  Avoid close and prolonged contact with  until all vesicles dry out and resolve.  Pain control with Norco - discussed judicious use.  Return precautions discussed and given to " patient.      Follow up with Provider - in next several days if worsening pain, more rash, or other new symptoms   Patient Instructions   Start valacyclovir as prescribed.  Take Hydrocodone-Acetaminophen 5/325 mg 1 tablet orally every 6 hrs as needed for severe pain only.  Do not take Norco when driving, when at work,  or when operating heavy equipment.    You may receive your flu shot in 2-3 weeks.    If with increasing pain after next several days, see provider again.    Avoid activities that increase pain of the rash.    Avoid close prolonged contact with your two-month old daughter until the rashes completely dry out and no new rashes appear.      Shingles  Shingles is a viral infection caused by the same virus as chicken pox. Anyone who has had chicken pox may get shingles later in life. The virus stays in the body, but remains dormant (asleep). Shingles often occurs in older persons or persons with lowered immunity. But it can affect anyone at any age.  Shingles starts as a tingling patch of skin on one side of the body. Small, painful blisters may then appear. The rash does not spread to the rest of the body.  Exposure to shingles cannot cause shingles. However, it can cause chicken pox in anyone who has not had chicken pox or has not been vaccinated. The contagious period ends when all blisters have crusted over (generally about 2 weeks after the illness begins).  After the blisters heal, the affected skin may be sensitive or painful for months (neuralgia). This often gradually goes away.  A shingles vaccine is available. This can help prevent shingles or make it less painful. It is generally recommended for adults over the age of 60 who have had chicken pox in the past, but who have never had shingles. Adults over 60 who have had neither chicken pox nor shingles can prevent both diseases with the chicken pox vaccine. Ask your healthcare provider about these vaccines.  Home care    Medicines may be  prescribed to help relieve pain. Take these medicines as directed. Ask your healthcare provider or pharmacist before using over-the-counter medicines for helping treat pain and itching.    In certain cases, antiviral medicines may be prescribed to reduce pain, shorten the illness, and prevent neuralgia. Take these medicines as directed.    Compresses made from a solution of cool water mixed with cornstarch or baking soda may help relieve pain and itching.     Gently wash skin daily with soap and water to help prevent infection.  Be certain to rinse off all of the soap, which can be irritating.    Trim fingernails and try not to scratch. Scratching the sores may leave scars.    Stay home from work or school until all blisters have formed a crust and you are no longer contagious.  Follow-up care  Follow up with your healthcare provider or as directed by our staff.  When to seek medical advice    Fever of 100.4 F (38 C) or higher, or as directed by your healthcare provider    Affected skin is on the face or neck and any of the following occur:    Headache    Eye pain    Changes in vision    Sores near the eye    Weakness of facial muscles    Pain, redness, or swelling of a joint    Signs of skin infection: colored drainage from the sores, warmth, increasing redness, or increasing pain  Date Last Reviewed: 9/25/2015 2000-2017 [employer]. [address] [place] [address], [place] [address]. All rights reserved. This information is not intended as a substitute for professional medical care. Always follow your healthcare professional's instructions.            Jarrod Linn MD  [facility]

## 2017-10-02 NOTE — MR AVS SNAPSHOT
After Visit Summary   10/2/2017    Chet Byrd    MRN: [id number]           Patient Information     Date Of Birth          1995        Visit Information        Provider Department      10/2/2017 8:40 AM Jarrod Linn MD [facility]        Today's Diagnoses     Herpes zoster without complication    -  1      Care Instructions    Start valacyclovir as prescribed.  Take Hydrocodone-Acetaminophen 5/325 mg 1 tablet orally every 6 hrs as needed for severe pain only.  Do not take Norco when driving, when at work,  or when operating heavy equipment.    You may receive your flu shot in 2-3 weeks.    If with increasing pain after next several days, see provider again.    Avoid activities that increase pain of the rash.    Avoid close prolonged contact with your two-month old daughter until the rashes completely dry out and no new rashes appear.      Shingles  Shingles is a viral infection caused by the same virus as chicken pox. Anyone who has had chicken pox may get shingles later in life. The virus stays in the body, but remains dormant (asleep). Shingles often occurs in older persons or persons with lowered immunity. But it can affect anyone at any age.  Shingles starts as a tingling patch of skin on one side of the body. Small, painful blisters may then appear. The rash does not spread to the rest of the body.  Exposure to shingles cannot cause shingles. However, it can cause chicken pox in anyone who has not had chicken pox or has not been vaccinated. The contagious period ends when all blisters have crusted over (generally about 2 weeks after the illness begins).  After the blisters heal, the affected skin may be sensitive or painful for months (neuralgia). This often gradually goes away.  A shingles vaccine is available. This can help prevent shingles or make it less painful. It is generally recommended for adults over the age of 60 who have had chicken pox in the  past, but who have never had shingles. Adults over 60 who have had neither chicken pox nor shingles can prevent both diseases with the chicken pox vaccine. Ask your healthcare provider about these vaccines.  Home care    Medicines may be prescribed to help relieve pain. Take these medicines as directed. Ask your healthcare provider or pharmacist before using over-the-counter medicines for helping treat pain and itching.    In certain cases, antiviral medicines may be prescribed to reduce pain, shorten the illness, and prevent neuralgia. Take these medicines as directed.    Compresses made from a solution of cool water mixed with cornstarch or baking soda may help relieve pain and itching.     Gently wash skin daily with soap and water to help prevent infection.  Be certain to rinse off all of the soap, which can be irritating.    Trim fingernails and try not to scratch. Scratching the sores may leave scars.    Stay home from work or school until all blisters have formed a crust and you are no longer contagious.  Follow-up care  Follow up with your healthcare provider or as directed by our staff.  When to seek medical advice    Fever of 100.4 F (38 C) or higher, or as directed by your healthcare provider    Affected skin is on the face or neck and any of the following occur:    Headache    Eye pain    Changes in vision    Sores near the eye    Weakness of facial muscles    Pain, redness, or swelling of a joint    Signs of skin infection: colored drainage from the sores, warmth, increasing redness, or increasing pain  Date Last Reviewed: 9/25/2015 2000-2017 [employer]. [address] [place] [address], [place] [address]. All rights reserved. This information is not intended as a substitute for professional medical care. Always follow your healthcare professional's instructions.                Follow-ups after your visit        Who to contact     If you have questions or need follow up information about today's  "clinic visit or your schedule please contact [facility] directly at [phone].  Normal or non-critical lab and imaging results will be communicated to you by MyChart, letter or phone within 4 business days after the clinic has received the results. If you do not hear from us within 7 days, please contact the clinic through [employer] or phone. If you have a critical or abnormal lab result, we will notify you by phone as soon as possible.  Submit refill requests through [employer] or call your pharmacy and they will forward the refill request to us. Please allow 3 business days for your refill to be completed.          Additional Information About Your Visit        MyChart Information     [employer] lets you send messages to your doctor, view your test results, renew your prescriptions, schedule appointments and more. To sign up, go to [email or web address] . Click on \"Log in\" on the left side of the screen, which will take you to the Welcome page. Then click on \"Sign up Now\" on the right side of the page.     You will be asked to enter the access code listed below, as well as some personal information. Please follow the directions to create your username and password.     Your access code is: [email or web address]  Expires: 2017  9:26 AM     Your access code will  in 90 days. If you need help or a new code, please call your [facility] clinic or [phone].        Care EveryWhere ID     This is your Care EveryWhere ID. This could be used by other organizations to access your [facility] medical records  [id number]        Your Vitals Were     Pulse Temperature Height BMI (Body Mass Index)          57 97.5  F (36.4  C) (Tympanic) 5' 8.25\" (1.734 m) 25.99 kg/m2         Blood Pressure from Last 3 Encounters:   10/02/17 126/76   17 105/53   17 125/69    Weight from Last 3 Encounters:   10/02/17 172 lb 3.2 oz (78.1 kg)   17 181 lb (82.1 kg)   17 176 lb (79.8 kg)              Today, you " had the following     No orders found for display         Today's Medication Changes          These changes are accurate as of: 10/2/17  9:26 AM.  If you have any questions, ask your nurse or doctor.               Start taking these medicines.        Dose/Directions    HYDROcodone-acetaminophen 5-325 MG per tablet   Commonly known as:  NORCO   Used for:  Herpes zoster without complication   Started by:  Jarrod Linn MD        Dose:  1 tablet   Take 1 tablet by mouth every 6 hours as needed for moderate to severe pain   Quantity:  20 tablet   Refills:  0       valACYclovir 1000 mg tablet   Commonly known as:  VALTREX   Used for:  Herpes zoster without complication   Started by:  Jarrod Linn MD        Dose:  1000 mg   Take 1 tablet (1,000 mg) by mouth 3 times daily   Quantity:  21 tablet   Refills:  0            Where to get your medicines      These medications were sent to [facility] [place], [place] - [address]  [address] [address]     Phone:  [phone]     valACYclovir 1000 mg tablet         Some of these will need a paper prescription and others can be bought over the counter.  Ask your nurse if you have questions.     Bring a paper prescription for each of these medications     HYDROcodone-acetaminophen 5-325 MG per tablet                Primary Care Provider    None Specified       No primary provider on file.        Equal Access to Services     LATISHA PALACIOS : Marco Shannon, chacho garvin, sakshi weems, faiza guerin. So [facility] [phone].    ATENCIÓN: Si habla español, tiene a bettencourt disposición servicios gratuitos de asistencia lingüística. Llame al [phone].    We comply with applicable federal civil rights laws and [place] laws. We do not discriminate on the basis of race, color, national origin, age, disability, sex, sexual orientation, or gender identity.            Thank you!      Thank you for choosing [facility]  for your care. Our goal is always to provide you with excellent care. Hearing back from our patients is one way we can continue to improve our services. Please take a few minutes to complete the written survey that you may receive in the mail after your visit with us. Thank you!             Your Updated Medication List - Protect others around you: Learn how to safely use, store and throw away your medicines at www.disposemymeds.org.          This list is accurate as of: 10/2/17  9:26 AM.  Always use your most recent med list.                   Brand Name Dispense Instructions for use Diagnosis    HYDROcodone-acetaminophen 5-325 MG per tablet    NORCO    20 tablet    Take 1 tablet by mouth every 6 hours as needed for moderate to severe pain    Herpes zoster without complication       valACYclovir 1000 mg tablet    VALTREX    21 tablet    Take 1 tablet (1,000 mg) by mouth 3 times daily    Herpes zoster without complication

## 2017-12-18 ENCOUNTER — APPOINTMENT (OUTPATIENT)
Dept: GENERAL RADIOLOGY | Facility: CLINIC | Age: 22
End: 2017-12-18
Attending: PHYSICIAN ASSISTANT
Payer: COMMERCIAL

## 2017-12-18 ENCOUNTER — HOSPITAL ENCOUNTER (EMERGENCY)
Facility: CLINIC | Age: 22
Discharge: HOME | End: 2017-12-18
Attending: PHYSICIAN ASSISTANT | Admitting: PHYSICIAN ASSISTANT
Payer: COMMERCIAL

## 2017-12-18 VITALS
HEIGHT: 68 IN | BODY MASS INDEX: 25.76 KG/M2 | HEART RATE: 103 BPM | OXYGEN SATURATION: 99 % | WEIGHT: 170 LBS | RESPIRATION RATE: 16 BRPM

## 2017-12-18 DIAGNOSIS — S39.012A LOW BACK STRAIN, INITIAL ENCOUNTER: ICD-10-CM

## 2017-12-18 DIAGNOSIS — M62.830 BACK MUSCLE SPASM: ICD-10-CM

## 2017-12-18 PROCEDURE — 96372 THER/PROPH/DIAG INJ SC/IM: CPT

## 2017-12-18 PROCEDURE — 20552 NJX 1/MLT TRIGGER POINT 1/2: CPT | Performed by: PHYSICIAN ASSISTANT

## 2017-12-18 PROCEDURE — 99214 OFFICE O/P EST MOD 30 MIN: CPT | Mod: 25

## 2017-12-18 PROCEDURE — 25000128 H RX IP 250 OP 636: Performed by: PHYSICIAN ASSISTANT

## 2017-12-18 PROCEDURE — 20552 NJX 1/MLT TRIGGER POINT 1/2: CPT

## 2017-12-18 PROCEDURE — 72100 X-RAY EXAM L-S SPINE 2/3 VWS: CPT

## 2017-12-18 PROCEDURE — 99214 OFFICE O/P EST MOD 30 MIN: CPT | Mod: 25 | Performed by: PHYSICIAN ASSISTANT

## 2017-12-18 RX ORDER — KETOROLAC TROMETHAMINE 30 MG/ML
60 INJECTION, SOLUTION INTRAMUSCULAR; INTRAVENOUS ONCE
Status: DISCONTINUED | OUTPATIENT
Start: 2017-12-18 | End: 2017-12-18 | Stop reason: CLARIF

## 2017-12-18 RX ORDER — TRIAMCINOLONE ACETONIDE 40 MG/ML
40 INJECTION, SUSPENSION INTRA-ARTICULAR; INTRAMUSCULAR ONCE
Status: COMPLETED | OUTPATIENT
Start: 2017-12-18 | End: 2017-12-18

## 2017-12-18 RX ORDER — KETOROLAC TROMETHAMINE 30 MG/ML
60 INJECTION, SOLUTION INTRAMUSCULAR; INTRAVENOUS ONCE
Status: COMPLETED | OUTPATIENT
Start: 2017-12-18 | End: 2017-12-18

## 2017-12-18 RX ORDER — CYCLOBENZAPRINE HCL 10 MG
10 TABLET ORAL 3 TIMES DAILY PRN
Qty: 30 TABLET | Refills: 0 | Status: SHIPPED | OUTPATIENT
Start: 2017-12-18

## 2017-12-18 RX ADMIN — TRIAMCINOLONE ACETONIDE 40 MG: 40 INJECTION, SUSPENSION INTRA-ARTICULAR; INTRAMUSCULAR at 12:20

## 2017-12-18 RX ADMIN — KETOROLAC TROMETHAMINE 60 MG: 30 INJECTION, SOLUTION INTRAMUSCULAR at 13:44

## 2017-12-18 NOTE — ED AVS SNAPSHOT
[facility] Emergency Department    [address] [address]    Phone:  [phone]    Fax:  [phone]                                       Chet Byrd   MRN: [id number]    Department:  [facility] Emergency Department   Date of Visit:  12/18/2017           Patient Information     Date Of Birth          1995        Your diagnoses for this visit were:     Low back strain, initial encounter     Back muscle spasm        You were seen by David Mondragon PA-C.        Discharge Instructions         Relieving Back Pain  Back pain is a common problem. You can strain back muscles by lifting too much weight or just by moving the wrong way. Back strain can be uncomfortable, even painful. And it can take weeks or months to improve. To help yourself feel better and prevent future back strains, try these tips.  Important Note: Do not give aspirin to children or teens without first discussing it with your healthcare provider.      ? Ice    Ice reduces muscle pain and swelling. It helps most during the first 24 to 48 hours after an injury.    Wrap an ice pack or a bag of frozen peas in a thin towel. (Never place ice directly on your skin.)    Place the ice where your back hurts the most.    Don t ice for more than 20 minutes at a time.    You can use ice several times a day.  ? Medicines  Over-the-counter pain relievers can include acetaminophen and anti-inflammatory medicines, which includes aspirin or ibuprofen. They can help ease discomfort. Some also reduce swelling.    Tell your healthcare provider about any medicines you are already taking.    Take medicines only as directed.  ? Heat  After the first 48 hours, heat can relax sore muscles and improve blood flow.    Try a warm bath or shower. Or use a heating pad set on low. To prevent a burn, keep a cloth between you and the heating pad.    Don t use a heating pad for more than 15 minutes at a time. Never sleep on a heating  pad.  Date Last Reviewed: 9/1/2015 2000-2017 [employer]. [address] [place] [address], [place] [address]. All rights reserved. This information is not intended as a substitute for professional medical care. Always follow your healthcare professional's instructions.          24 Hour Appointment Hotline       To make an appointment at any [facility] call [phone] ([phone]). If you don't have a family doctor or clinic, we will help you find one. [facility] are conveniently located to serve the needs of you and your family.             Review of your medicines      START taking        Dose / Directions Last dose taken    cyclobenzaprine 10 MG tablet   Commonly known as:  FLEXERIL   Dose:  10 mg   Quantity:  30 tablet        Take 1 tablet (10 mg) by mouth 3 times daily as needed for muscle spasms   Refills:  0          Our records show that you are taking the medicines listed below. If these are incorrect, please call your family doctor or clinic.        Dose / Directions Last dose taken    HYDROcodone-acetaminophen 5-325 MG per tablet   Commonly known as:  NORCO   Dose:  1 tablet   Quantity:  20 tablet        Take 1 tablet by mouth every 6 hours as needed for moderate to severe pain   Refills:  0        valACYclovir 1000 mg tablet   Commonly known as:  VALTREX   Dose:  1000 mg   Quantity:  21 tablet        Take 1 tablet (1,000 mg) by mouth 3 times daily   Refills:  0                Prescriptions were sent or printed at these locations (1 Prescription)                   [facility] [address] [address]    Telephone:  [phone]   Fax:  [phone]   Hours:                  E-Prescribed (1 of 1)         cyclobenzaprine (FLEXERIL) 10 MG tablet                Procedures and tests performed during your visit     Lumbar spine XR, 2-3 views      Orders Needing Specimen Collection     None      Pending Results     No  "orders found from 2017 to 2017.            Pending Culture Results     No orders found from 2017 to 2017.            Pending Results Instructions     If you had any lab results that were not finalized at the time of your Discharge, you can call the ED Lab Result RN at [phone]. You will be contacted by this team for any positive Lab results or changes in treatment. The nurses are available 7 days a week from 10A to 6:30P.  You can leave a message 24 hours per day and they will return your call.        Test Results From Your Hospital Stay        2017  1:31 PM      Narrative     XR LUMBAR SPINE 2-3 VIEWS 2017 12:56 PM    HISTORY: Fall. Pain.    COMPARISON: None.        Impression     IMPRESSION: 3 views of the lumbosacral spine show no fracture or  malalignment. Disc spaces are well-maintained.     JAVON ASTUDILLO MD                Thank you for choosing [facility]       Thank you for choosing [facility] for your care. Our goal is always to provide you with excellent care. Hearing back from our patients is one way we can continue to improve our services. Please take a few minutes to complete the written survey that you may receive in the mail after you visit with us. Thank you!        [employer] Information     [employer] lets you send messages to your doctor, view your test results, renew your prescriptions, schedule appointments and more. To sign up, go to [email or web address] . Click on \"Log in\" on the left side of the screen, which will take you to the Welcome page. Then click on \"Sign up Now\" on the right side of the page.     You will be asked to enter the access code listed below, as well as some personal information. Please follow the directions to create your username and password.     Your access code is: [email or web address]  Expires: 2017  8:26 AM     Your access code will  in 90 days. If you need help or a new code, please call your [facility] clinic or [phone].      "   Care EveryWhere ID     This is your Care EveryWhere ID. This could be used by other organizations to access your [facility] medical records  [id number]        Equal Access to Services     LATISHA PALACIOS : Marco Shanonn, chacho garvin, faiza ariza. So [facility] [phone].    ATENCIÓN: Si habla español, tiene a bettencourt disposición servicios gratuitos de asistencia lingüística. Llame al [phone].    We comply with applicable federal civil rights laws and [place] laws. We do not discriminate on the basis of race, color, national origin, age, disability, sex, sexual orientation, or gender identity.            After Visit Summary       This is your record. Keep this with you and show to your community pharmacist(s) and doctor(s) at your next visit.

## 2017-12-18 NOTE — DISCHARGE INSTRUCTIONS
Relieving Back Pain  Back pain is a common problem. You can strain back muscles by lifting too much weight or just by moving the wrong way. Back strain can be uncomfortable, even painful. And it can take weeks or months to improve. To help yourself feel better and prevent future back strains, try these tips.  Important Note: Do not give aspirin to children or teens without first discussing it with your healthcare provider.      ? Ice    Ice reduces muscle pain and swelling. It helps most during the first 24 to 48 hours after an injury.    Wrap an ice pack or a bag of frozen peas in a thin towel. (Never place ice directly on your skin.)    Place the ice where your back hurts the most.    Don t ice for more than 20 minutes at a time.    You can use ice several times a day.  ? Medicines  Over-the-counter pain relievers can include acetaminophen and anti-inflammatory medicines, which includes aspirin or ibuprofen. They can help ease discomfort. Some also reduce swelling.    Tell your healthcare provider about any medicines you are already taking.    Take medicines only as directed.  ? Heat  After the first 48 hours, heat can relax sore muscles and improve blood flow.    Try a warm bath or shower. Or use a heating pad set on low. To prevent a burn, keep a cloth between you and the heating pad.    Don t use a heating pad for more than 15 minutes at a time. Never sleep on a heating pad.  Date Last Reviewed: 9/1/2015 2000-2017 [employer]. [address] [place] [address], [place] [address]. All rights reserved. This information is not intended as a substitute for professional medical care. Always follow your healthcare professional's instructions.

## 2017-12-18 NOTE — LETTER
[facility] EMERGENCY DEPARTMENT  [address] [address]  [phone]          December 18, 2017    RE:  Chet Byrd                                                                                                                                                       [address]   [facility] [place] [facility] [address]            To whom it may concern:    Chet Byrd is under my professional care for    Low back strain, initial encounter  Back muscle spasm He will not be able to work until he follows up with his regular doctor.        Sincerely,      David Mondragon PA-C

## 2017-12-18 NOTE — ED AVS SNAPSHOT
[facility] Emergency Department    [address] [address]    Phone:  [phone]    Fax:  [phone]                                       Chet Byrd   MRN: [id number]    Department:  [facility] Emergency Department   Date of Visit:  12/18/2017           After Visit Summary Signature Page     I have received my discharge instructions, and my questions have been answered. I have discussed any challenges I see with this plan with the nurse or doctor.    ..........................................................................................................................................  Patient/Patient Representative Signature      ..........................................................................................................................................  Patient Representative Print Name and Relationship to Patient    ..................................................               ................................................  Date                                            Time    ..........................................................................................................................................  Reviewed by Signature/Title    ...................................................              ..............................................  Date                                                            Time

## 2017-12-18 NOTE — ED PROVIDER NOTES
"Chief Complaint:     No chief complaint on file.        HPI: Chet Byrd22 year old male presents with a 1 day history of low back pain.  Patient was tripped by his dog last niught and had some mild low back pain.  This morning hew was at work and bent over to tie his shoes and began to experience worsening back pain.  The pain is localized to midline lower lumbar area. There is no associated sciatica in the legs. There is no paresthesia in the legs. The patient does have a history of weakness in the legs.       The patient's most comfortable position is laying down . There is no history of disturbance of bowel or bladder dysfunction associated with this present problem.  The patient is in good general health with no history for cancer or unexplained weight loss.     ROS:  All other systems were reviewed and are negative.     Problem history  Patient Active Problem List   Diagnosis     Syncope, unspecified syncope type     Sinus bradycardia        Allergies  No Known Allergies     Smoking History  History   Smoking Status     Current Every Day Smoker     Packs/day: 0.50     Years: 8.00     Types: Cigarettes   Smokeless Tobacco     Never Used        Current Meds  No current facility-administered medications for this encounter.     Current Outpatient Prescriptions:      valACYclovir (VALTREX) 1000 mg tablet, Take 1 tablet (1,000 mg) by mouth 3 times daily, Disp: 21 tablet, Rfl: 0     HYDROcodone-acetaminophen (NORCO) 5-325 MG per tablet, Take 1 tablet by mouth every 6 hours as needed for moderate to severe pain, Disp: 20 tablet, Rfl: 0        Physical Exam:     Vital signs reviewed by David Mondragon  Pulse 103  Resp 16  Ht 1.727 m (5' 8\")  Wt 77.1 kg (170 lb)  SpO2 99%  BMI 25.85 kg/m2     PEFR:  General appearance: healthy, alert and no distress  Ears: R TM - normal: no effusions, no erythema, and normal landmarks, L TM - normal: no effusions, no erythema, and normal landmarks  Eyes - R Normal, L " Normal  Nose: normal  Oropharynx: normal  Neck: supple and no adenopathy  Lungs: normal and clear to auscultation  Heart: Normal  Abdomen - Abdomen soft, non-tender without masses or organomegaly  Extremities - Extremities normal. No deformities, edema, or skin discoloration.        The gait is normal.  Heel and toe walking are intact.  The lumbar spine shows normal lordosis.    Palpation of the spine does show areas of tenderness in the lower lumbar area.     Deep tendon reflexes at right knee1+,  deep tendon reflexes at left knee 1+,  deep tendon reflexes at right ankle 1+,  deep tendon reflexes at left ankle 1+.     Sensation testing at medial dorsal, and lateral aspect of the foot was intact. Strength testing of dorsiflexion of the great toes, dorsiflexion of the foot and ankle, planter flexion of the foot, flexion and extension of the knees, adduction and abduction of the hips, were all normal.   Straight leg raising in the right leg was negative, and in the left leg was negative.    Results for orders placed or performed during the hospital encounter of 12/18/17   Lumbar spine XR, 2-3 views    Narrative    XR LUMBAR SPINE 2-3 VIEWS 12/18/2017 12:56 PM    HISTORY: Fall. Pain.    COMPARISON: None.      Impression    IMPRESSION: 3 views of the lumbosacral spine show no fracture or  malalignment. Disc spaces are well-maintained.     JAVON ASTUDILLO MD          A:     (S39.012A) Low back strain, initial encounter  Plan: cyclobenzaprine (FLEXERIL) 10 MG tablet    (M62.830) Back muscle spasm  Plan: cyclobenzaprine (FLEXERIL) 10 MG tablet       P:     Trigger Point Injection  After informed consent was obtained including risks, benefits, and alternatives, I did perform a trigger point injection of the patient's midline lower lumbar area.  After I was able to demarcate the painful area, I cleansed the skin with alcohol. I then used sterile technique and a 27-gauge 1.5-inch needle to infuse 40 mg of Kenalog along with 4 mL  of 0.5% Bupivicaine using sterile technique. Patient tolerated this well with no complications. Patient verbalized Some relief if symptoms.    Patient given 60 Mg Toradol IM in clinic today.  He was advised no ibuprofen for 8 hours.  He may continue to take tylenol.  Rx for flexeril sent in.  He was instructed to ice the area.  Stay active.  Massage and or chiropractic may help.  Return to the ED with any worsening symptoms.  I will take him off of work until he follows up with his PCP as he has been having low back problems more often.  Patient verbalized understanding and agreed with this plan.           David Mondragon  12/18/2017, 12:01 PM       David Mondragon PA-C  12/18/17 [id number]

## 2017-12-19 ENCOUNTER — OFFICE VISIT (OUTPATIENT)
Dept: FAMILY MEDICINE | Facility: CLINIC | Age: 22
End: 2017-12-19
Payer: COMMERCIAL

## 2017-12-19 VITALS — SYSTOLIC BLOOD PRESSURE: 125 MMHG | HEART RATE: 49 BPM | DIASTOLIC BLOOD PRESSURE: 49 MMHG

## 2017-12-19 DIAGNOSIS — S39.012S LOW BACK STRAIN, SEQUELA: Primary | ICD-10-CM

## 2017-12-19 PROCEDURE — 99213 OFFICE O/P EST LOW 20 MIN: CPT | Performed by: INTERNAL MEDICINE

## 2017-12-19 RX ORDER — KETOROLAC TROMETHAMINE 10 MG/1
10 TABLET, FILM COATED ORAL EVERY 6 HOURS PRN
Qty: 40 TABLET | Refills: 1 | Status: SHIPPED | OUTPATIENT
Start: 2017-12-19

## 2017-12-19 NOTE — LETTER
[facility]  [address] [address]  Phone: [phone]    December 19, 2017        Chet Byrd  [address]   [place] [address]          To whom it may concern:    RE: Chet Menendez was seen today in clinic.  He may return to work Tuesday, December 26th, 2017 if improved.     Please contact me for questions or concerns.      Sincerely,        Madan Sheppard MD

## 2017-12-19 NOTE — MR AVS SNAPSHOT
"              After Visit Summary   12/19/2017    Chet Byrd    MRN: [id number]           Patient Information     Date Of Birth          1995        Visit Information        Provider Department      12/19/2017 8:40 AM Madan Sheppard MD [facility]        Today's Diagnoses     Low back strain, sequela    -  1      Care Instructions    We'll add in the oral ketorolac for pain.  You can take this along with the Flexeril and Tylenol- alternate between these to keep on top of the pain.  If not improving, we can think about adding gabapentin too.    Remain off of work until Dec 26th.  If not better by then, follow-up in clinic.            Follow-ups after your visit        Who to contact     If you have questions or need follow up information about today's clinic visit or your schedule please contact [facility] directly at [phone].  Normal or non-critical lab and imaging results will be communicated to you by MyChart, letter or phone within 4 business days after the clinic has received the results. If you do not hear from us within 7 days, please contact the clinic through [employer] or phone. If you have a critical or abnormal lab result, we will notify you by phone as soon as possible.  Submit refill requests through [employer] or call your pharmacy and they will forward the refill request to us. Please allow 3 business days for your refill to be completed.          Additional Information About Your Visit        MyChart Information     [employer]Check lets you send messages to your doctor, view your test results, renew your prescriptions, schedule appointments and more. To sign up, go to [email or web address] . Click on \"Log in\" on the left side of the screen, which will take you to the Welcome page. Then click on \"Sign up Now\" on the right side of the page.     You will be asked to enter the access code listed below, as well as some personal information. Please follow the directions to " create your username and password.     Your access code is: [email or web address]  Expires: 2017  8:26 AM     Your access code will  in 90 days. If you need help or a new code, please call your [facility] clinic or [phone].        Care EveryWhere ID     This is your Care EveryWhere ID. This could be used by other organizations to access your [facility] medical records  [id number]        Your Vitals Were     Pulse                   49            Blood Pressure from Last 3 Encounters:   17 125/49   10/02/17 126/76   17 105/53    Weight from Last 3 Encounters:   17 170 lb (77.1 kg)   10/02/17 172 lb 3.2 oz (78.1 kg)   17 181 lb (82.1 kg)              Today, you had the following     No orders found for display         Today's Medication Changes          These changes are accurate as of: 17  9:24 AM.  If you have any questions, ask your nurse or doctor.               Start taking these medicines.        Dose/Directions    ketorolac 10 MG tablet   Commonly known as:  TORADOL   Used for:  Low back strain, sequela   Started by:  Madan Sheppard MD        Dose:  10 mg   Take 1 tablet (10 mg) by mouth every 6 hours as needed for moderate pain   Quantity:  40 tablet   Refills:  1         Stop taking these medicines if you haven't already. Please contact your care team if you have questions.     HYDROcodone-acetaminophen 5-325 MG per tablet   Commonly known as:  NORCO   Stopped by:  Madan Sheppard MD                Where to get your medicines      These medications were sent to [facility] [address]  [address] [address]     Phone:  [phone]     ketorolac 10 MG tablet                Primary Care Provider Fax #    Physician No Ref-Primary [phone]       No address on file        Equal Access to Services     LATISHA PALACIOS AH: Marco Shannon, washandada ludeborah, qaybta kaaljovana weems, faiza altman  lacinthia guerin. So [facility] [phone].    ATENCIÓN: Si habla farrukh, tiene a bettencourt disposición servicios gratuitos de asistencia lingüística. Fer al [phone].    We comply with applicable federal civil rights laws and [place] laws. We do not discriminate on the basis of race, color, national origin, age, disability, sex, sexual orientation, or gender identity.            Thank you!     Thank you for choosing [facility]  for your care. Our goal is always to provide you with excellent care. Hearing back from our patients is one way we can continue to improve our services. Please take a few minutes to complete the written survey that you may receive in the mail after your visit with us. Thank you!             Your Updated Medication List - Protect others around you: Learn how to safely use, store and throw away your medicines at www.disposemymeds.org.          This list is accurate as of: 12/19/17  9:24 AM.  Always use your most recent med list.                   Brand Name Dispense Instructions for use Diagnosis    cyclobenzaprine 10 MG tablet    FLEXERIL    30 tablet    Take 1 tablet (10 mg) by mouth 3 times daily as needed for muscle spasms    Low back strain, initial encounter, Back muscle spasm       ketorolac 10 MG tablet    TORADOL    40 tablet    Take 1 tablet (10 mg) by mouth every 6 hours as needed for moderate pain    Low back strain, sequela       valACYclovir 1000 mg tablet    VALTREX    21 tablet    Take 1 tablet (1,000 mg) by mouth 3 times daily    Herpes zoster without complication

## 2017-12-19 NOTE — NURSING NOTE
"Chief Complaint   Patient presents with     Back Pain     on and off for the last five years. Sunday night 12/17/17 his dog knocked him over onto his backside/tailbone       Initial /49 (BP Location: Right arm, Patient Position: Chair, Cuff Size: Adult Regular)  Pulse (!) 49 Estimated body mass index is 25.85 kg/(m^2) as calculated from the following:    Height as of 12/18/17: 5' 8\" (1.727 m).    Weight as of 12/18/17: 170 lb (77.1 kg).  Medication Reconciliation: complete   Nancy Ocampo CMA    "

## 2017-12-19 NOTE — PROGRESS NOTES
SUBJECTIVE:   Chet Byrd is a 22 year old male who presents to clinic today for the following health issues:    ED/UC Followup:  Facility:  [facility]  Date of visit: 12/18/2017  Reason for visit: Low back pain  Current Status: same pain     Back Pain     Duration: three days        Specific cause: fall, dogs leash knocked him off his feet onto his tailbone     Description:   Location of pain: low back bilateral and tailbone  Character of pain: sharp, dull ache and constant  Pain radiation:none  New numbness or weakness in legs, not attributed to pain:  no     Intensity: Currently 7/10, At its worst 10/10    History:   Pain interferes with job: YES  History of back problems: YES, disc bulge   Any previous MRI or X-rays: Yes- at [facility].  Date 12/18/2017  Sees a specialist for back pain:  No  Therapies tried without relief: cold and heat    Alleviating factors:   Improved by: rest and inactivity, flexeril, norco       Precipitating factors:  Worsened by: Lifting, Bending, Standing, Sitting, Lying Flat and Walking    Functional and Psychosocial Screen (Kimberlyn Redding Back):      Not performed today    Accompanying Signs & Symptoms:  Risk of Fracture:  No-normal x-ray in ED  Risk of Cauda Equina:  None  Risk of Infection:  None  Risk of Cancer:  None  Risk of Ankylosing Spondylitis:  Onset at age <35, male, AND morning back stiffness. yaneth Menendez was seen in the ED yesterday after having 1 day of low back pain that started after he tripped on his dog and fell onto his buttocks.  He had a normal lumbar x-ray in the ED.  He was given IM toradol and trigger point injections and did have improvement in pain, but this only lasted a few hours.  He was given a prescription for Flexeril and has been taking this every 4-5 hours without much improvement.  Tylenol has not been helpful.  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        Problem list and histories reviewed &  adjusted, as indicated.  Additional history: as documented    Patient Active Problem List   Diagnosis     Syncope, unspecified syncope type     Sinus bradycardia     History reviewed. No pertinent surgical history.    Social History   Substance Use Topics     Smoking status: Current Every Day Smoker     Packs/day: 0.50     Years: 8.00     Types: Cigarettes     Smokeless tobacco: Never Used     Alcohol use No      Comment: None as of lately.  Previous use.  5-24-17 visit.     Family History   Problem Relation Age of Onset     DIABETES Mother      DIABETES Father      Coronary Artery Disease Maternal Grandfather      Multiple stents.     Brain Tumor Maternal Grandfather          Current Outpatient Prescriptions   Medication Sig Dispense Refill     ketorolac (TORADOL) 10 MG tablet Take 1 tablet (10 mg) by mouth every 6 hours as needed for moderate pain 40 tablet 1     cyclobenzaprine (FLEXERIL) 10 MG tablet Take 1 tablet (10 mg) by mouth 3 times daily as needed for muscle spasms 30 tablet 0     valACYclovir (VALTREX) 1000 mg tablet Take 1 tablet (1,000 mg) by mouth 3 times daily 21 tablet 0     No Known Allergies      Reviewed and updated as needed this visit by clinical staffTobacco  Allergies  Med Hx  Surg Hx  Fam Hx  Soc Hx      Reviewed and updated as needed this visit by Provider         ROS:  Constitutional, MSK systems are negative, except as otherwise noted.      OBJECTIVE:   /49 (BP Location: Right arm, Patient Position: Chair, Cuff Size: Adult Regular)  Pulse (!) 49  There is no height or weight on file to calculate BMI.    GENERAL:  Alert, sitting in wheelchair, appears uncomfortable  BACK: Pain to palpation over lower lumbar spine and bilateral paraspinal lumbar area  NEURO: Normal strength and tone in legs, normal light touch sensation, symmetric Patellar reflexes      ASSESSMENT/PLAN:       1. Low back strain, sequela    Shon's pain is likely due to muscle strain and perhaps some bruising from  his fall.  No fractures on x-ray yesterday and no symptoms to suggest radiculopathy.  Pain is not controlled with Flexeril and Tylenol.  The Toradol injection he had in the ED yesterday was helpful, so will try PO Toradol.  If not helping, can try adding gabapentin.  He and I would prefer to avoid narcotics.  Advised him to remain off of work for the next week, he can return on the 26th if improved.  If not improved by then, follow-up in clinic.     - ketorolac (TORADOL) 10 MG tablet; Take 1 tablet (10 mg) by mouth every 6 hours as needed for moderate pain  Dispense: 40 tablet; Refill: 1    Madan Sheppard MD  [facility]

## 2017-12-19 NOTE — PATIENT INSTRUCTIONS
We'll add in the oral ketorolac for pain.  You can take this along with the Flexeril and Tylenol- alternate between these to keep on top of the pain.  If not improving, we can think about adding gabapentin too.    Remain off of work until Dec 26th.  If not better by then, follow-up in clinic.

## 2018-01-15 ENCOUNTER — HOSPITAL ENCOUNTER (EMERGENCY)
Facility: CLINIC | Age: 23
Discharge: HOME | End: 2018-01-15
Attending: STUDENT IN AN ORGANIZED HEALTH CARE EDUCATION/TRAINING PROGRAM | Admitting: STUDENT IN AN ORGANIZED HEALTH CARE EDUCATION/TRAINING PROGRAM
Payer: COMMERCIAL

## 2018-01-15 ENCOUNTER — APPOINTMENT (OUTPATIENT)
Dept: GENERAL RADIOLOGY | Facility: CLINIC | Age: 23
End: 2018-01-15
Attending: STUDENT IN AN ORGANIZED HEALTH CARE EDUCATION/TRAINING PROGRAM
Payer: COMMERCIAL

## 2018-01-15 VITALS
WEIGHT: 180 LBS | RESPIRATION RATE: 18 BRPM | TEMPERATURE: 98.4 F | DIASTOLIC BLOOD PRESSURE: 65 MMHG | SYSTOLIC BLOOD PRESSURE: 153 MMHG | BODY MASS INDEX: 27.37 KG/M2 | OXYGEN SATURATION: 100 %

## 2018-01-15 DIAGNOSIS — L03.032 CELLULITIS AND ABSCESS OF TOE OF LEFT FOOT: ICD-10-CM

## 2018-01-15 DIAGNOSIS — L02.612 CELLULITIS AND ABSCESS OF TOE OF LEFT FOOT: ICD-10-CM

## 2018-01-15 PROCEDURE — 99284 EMERGENCY DEPT VISIT MOD MDM: CPT | Mod: 25

## 2018-01-15 PROCEDURE — 25000132 ZZH RX MED GY IP 250 OP 250 PS 637: Performed by: STUDENT IN AN ORGANIZED HEALTH CARE EDUCATION/TRAINING PROGRAM

## 2018-01-15 PROCEDURE — 87070 CULTURE OTHR SPECIMN AEROBIC: CPT | Performed by: STUDENT IN AN ORGANIZED HEALTH CARE EDUCATION/TRAINING PROGRAM

## 2018-01-15 PROCEDURE — 99284 EMERGENCY DEPT VISIT MOD MDM: CPT | Mod: 25 | Performed by: STUDENT IN AN ORGANIZED HEALTH CARE EDUCATION/TRAINING PROGRAM

## 2018-01-15 PROCEDURE — 87077 CULTURE AEROBIC IDENTIFY: CPT | Performed by: STUDENT IN AN ORGANIZED HEALTH CARE EDUCATION/TRAINING PROGRAM

## 2018-01-15 PROCEDURE — 96372 THER/PROPH/DIAG INJ SC/IM: CPT | Mod: 59

## 2018-01-15 PROCEDURE — 87186 SC STD MICRODIL/AGAR DIL: CPT | Performed by: STUDENT IN AN ORGANIZED HEALTH CARE EDUCATION/TRAINING PROGRAM

## 2018-01-15 PROCEDURE — 73630 X-RAY EXAM OF FOOT: CPT | Mod: LT

## 2018-01-15 PROCEDURE — 10060 I&D ABSCESS SIMPLE/SINGLE: CPT | Mod: Z6 | Performed by: STUDENT IN AN ORGANIZED HEALTH CARE EDUCATION/TRAINING PROGRAM

## 2018-01-15 PROCEDURE — 25000128 H RX IP 250 OP 636: Performed by: STUDENT IN AN ORGANIZED HEALTH CARE EDUCATION/TRAINING PROGRAM

## 2018-01-15 PROCEDURE — 10060 I&D ABSCESS SIMPLE/SINGLE: CPT

## 2018-01-15 RX ORDER — CEPHALEXIN 500 MG/1
500 CAPSULE ORAL ONCE
Status: COMPLETED | OUTPATIENT
Start: 2018-01-15 | End: 2018-01-15

## 2018-01-15 RX ORDER — SULFAMETHOXAZOLE/TRIMETHOPRIM 800-160 MG
1 TABLET ORAL ONCE
Status: COMPLETED | OUTPATIENT
Start: 2018-01-15 | End: 2018-01-15

## 2018-01-15 RX ORDER — CEPHALEXIN 500 MG/1
500 CAPSULE ORAL 4 TIMES DAILY
Qty: 40 CAPSULE | Refills: 0 | Status: SHIPPED | OUTPATIENT
Start: 2018-01-15

## 2018-01-15 RX ORDER — KETOROLAC TROMETHAMINE 30 MG/ML
30 INJECTION, SOLUTION INTRAMUSCULAR; INTRAVENOUS ONCE
Status: COMPLETED | OUTPATIENT
Start: 2018-01-15 | End: 2018-01-15

## 2018-01-15 RX ORDER — SULFAMETHOXAZOLE/TRIMETHOPRIM 800-160 MG
1 TABLET ORAL 2 TIMES DAILY
Qty: 20 TABLET | Refills: 0 | Status: SHIPPED | OUTPATIENT
Start: 2018-01-15 | End: 2018-01-25

## 2018-01-15 RX ADMIN — SULFAMETHOXAZOLE AND TRIMETHOPRIM 1 TABLET: 800; 160 TABLET ORAL at 01:35

## 2018-01-15 RX ADMIN — KETOROLAC TROMETHAMINE 30 MG: 30 INJECTION, SOLUTION INTRAMUSCULAR at 00:55

## 2018-01-15 RX ADMIN — CEPHALEXIN 500 MG: 500 CAPSULE ORAL at 01:36

## 2018-01-15 NOTE — ED AVS SNAPSHOT
[facility] Emergency Department    [address] [address]    Phone:  [phone]    Fax:  [phone]                                       Chet Byrd   MRN: [id number]    Department:  [facility] Emergency Department   Date of Visit:  1/15/2018           After Visit Summary Signature Page     I have received my discharge instructions, and my questions have been answered. I have discussed any challenges I see with this plan with the nurse or doctor.    ..........................................................................................................................................  Patient/Patient Representative Signature      ..........................................................................................................................................  Patient Representative Print Name and Relationship to Patient    ..................................................               ................................................  Date                                            Time    ..........................................................................................................................................  Reviewed by Signature/Title    ...................................................              ..............................................  Date                                                            Time

## 2018-01-15 NOTE — ED NOTES
PT presents to the ED with spouse and C/O pain to the left foot. PT states she is not sure how he injured his foot.   At this time PT is noted to have a red swollen toe with a black blister to the left 2nd toe. PT states pain is a 10/10 at this time. PT appears to be in NAD at this time. MD at bedside and now awaiting orders.

## 2018-01-15 NOTE — ED PROVIDER NOTES
History     Chief Complaint   Patient presents with     Foot Pain     HPI  Chet Byrd is a 22 year old male who presents for evaluation of left second toe pain and swelling.  Patient explains that over the past 3 days he has developed gradually increasing pain and swelling along the medial aspect of his toe.  He had been drinking a significant amount of alcohol the last couple days well ice fishing in [place], recalls stubbing his toes at some point but does not recall the specifics regarding the incident.  Since the swelling began he has also developed what appears to be a blood blister on the toe.  He denies recent fever/chills, history of skin infections, or other skin lesions.      Problem List:    Patient Active Problem List    Diagnosis Date Noted     Syncope, unspecified syncope type 05/24/2017     Priority: Medium     Sinus bradycardia 05/24/2017     Priority: Medium        Past Medical History:    No past medical history on file.    Past Surgical History:    No past surgical history on file.    Family History:    Family History   Problem Relation Age of Onset     DIABETES Mother      DIABETES Father      Coronary Artery Disease Maternal Grandfather      Multiple stents.     Brain Tumor Maternal Grandfather        Social History:  Marital Status:  Single [1]  Social History   Substance Use Topics     Smoking status: Current Every Day Smoker     Packs/day: 0.50     Years: 8.00     Types: Cigarettes     Smokeless tobacco: Never Used     Alcohol use No      Comment: None as of lately.  Previous use.  5-24-17 visit.        Medications:      sulfamethoxazole-trimethoprim (BACTRIM DS) 800-160 MG per tablet   cephALEXin (KEFLEX) 500 MG capsule   ketorolac (TORADOL) 10 MG tablet   cyclobenzaprine (FLEXERIL) 10 MG tablet   valACYclovir (VALTREX) 1000 mg tablet         Review of Systems  Constitutional:  Negative for fever or recent illness.  Musculoskeletal: Positive for left second toe pain and  swelling.  Negative for foot pain, ankle pain, or knee pain.  Neurological:  Negative for tensor deficits or weakness.  Skin: Positive for rash of left second toe per    All others reviewed and are negative.      Physical Exam   BP: 153/65  Heart Rate: 88  Temp: 98.4  F (36.9  C)  Resp: 18  Weight: 81.6 kg (180 lb)  SpO2: 100 %      Physical Exam  Constitutional:  Well developed, well nourished.  Appears nontoxic and in no acute distress.   Head:  Normocephalic and atraumatic.  Symmetrical in appearance.  Eyes:  Conjunctivae are normal.  Neck:  Neck supple.  Cardiovascular:  No cyanosis.   Respiratory/Chest:  Effort normal, no respiratory distress.   Musculoskeletal: Warm tender rash along medial aspect of second left toe with central bulla which appears hemorrhagic, no obvious deformity.  L5 dorsiflexion and dorsal foot sensory intact.  S1 plantar flexion and plantar foot sensation intact.  2/4 palpable dorsalis pedis and posterior tibial pulses.  No cyanosis and capillary refill less than 2 seconds in each digit.  Patient is able to flex/extend all digits suggesting intact tendons.  Neuro:  Patient is alert.  Skin:  Skin is warm and dry.  Psych:  Normal mood and affect.                  ED Course     ED Course     Procedures               Critical Care time:  [facility] Emergency Department Procedure Note     Procedure:  Incision and Drainage   Performed by:  Chu Lopez    Consent given by:  Patient who states an understanding of the procedure being performed after discussing the risks, benefits, and alternatives.  They also understand that although the area has been cleaned/irrigated thoroughly and prepared using sterile technique, we cannot with absolute certainty prevent further infection and they must monitor closely for healing or signs of infection.  Also, all wounds will likely form a scar but drainage is necessary for treatment of the infection.    LOCATIONS:  Left 2nd Toe     PREPARATION:  Patient  was prepped and draped in usual sterile fashion. Cleansed with Betadine     PROCEDURE:  Area was incised with # 11 Blade ([place]) with a Single Straight incision followed by removal of blister tissue.  Appropriate dressing was applied to cover the area.    Patient tolerance:  Patient tolerated the procedure well with no immediate complications.              Results for orders placed or performed during the hospital encounter of 01/15/18 (from the past 24 hour(s))   Foot XR, G/E 3 views, left    Narrative    XR FOOT LT G/E 3 VW  1/15/2018 12:54 AM      HISTORY: Pain and swelling of second toe.     COMPARISON: None.      Impression    IMPRESSION: No acute fracture or dislocation.         Assessments & Plan (with Medical Decision Making)   Chet Byrd is a 22 year old male who presents to the department for evaluation of pain and swelling of left second toe.  He admits to using alcohol over the weekend and believes that he stubbed his toes at some point but does not know if that is the etiology of his painful toe.  Radiograph does not identify fracture or bony pathology.  On clinical examination it looks as though the patient has a warm tender cellulitic appearing rash with central hemorrhagic bulla.  Verbal consent was obtained and the bulla was incised, mixture of bloody and purulent material drained, ulcer has been sent.  Afterwards antibiotic ointment was applied and the wound was dressed to prevent friction with left great toe. He was instructed to remove the bandage within 24 hours and apply OTC antibiotic ointment twice daily while healing.  Given his surrounding cellulitis, he is to be started on oral antibiotics Keflex and Bactrim for coverage of MRSA.  Tdap updated.    It is uncertain how the infection began, possibly due to traumatic skin injury.  Although the wound was thoroughly cleaned with Betadine and he has been started on oral antibiotics, he has been instructed to monitor the toe closely  for resolution of infection.  I made it clear that despite measures that infections can progress so he has been instructed to return to the emergency department for reevaluation of increasing pain, redness, discharge, red streaking lines/lymphangitis, or fever. If any are present, they are to return immediately to the emergency department.      Disclaimer: This note consists of symbols derived from keyboarding, dictation, and/or voice recognition software. As a result, there may be errors in the script that have gone undetected.  Please consider this when interpreting information found in the chart.         I have reviewed the nursing notes.    I have reviewed the findings, diagnosis, plan and need for follow up with the patient.       New Prescriptions    CEPHALEXIN (KEFLEX) 500 MG CAPSULE    Take 1 capsule (500 mg) by mouth 4 times daily    SULFAMETHOXAZOLE-TRIMETHOPRIM (BACTRIM DS) 800-160 MG PER TABLET    Take 1 tablet by mouth 2 times daily for 10 days       Final diagnoses:   Cellulitis and abscess of toe of left foot       1/15/2018   [facility] EMERGENCY DEPARTMENT     Chu Lopez DO  01/15/18 0137

## 2018-01-15 NOTE — DISCHARGE INSTRUCTIONS
Cellulitis  Cellulitis is an infection of the deep layers of skin. A break in the skin, such as a cut or scratch, can let bacteria under the skin. If the bacteria get to deep layers of the skin, it can be serious. If not treated, cellulitis can get into the bloodstream and lymph nodes. The infection can then spread throughout the body. This causes serious illness.  Cellulitis causes the affected skin to become red, swollen, warm, and sore. The reddened areas have a visible border. An open sore may leak fluid (pus). You may have a fever, chills, and pain.  Cellulitis is treated with antibiotics taken for 7 to 10 days. An open sore may be cleaned and covered with cool wet gauze. Symptoms should get better 1 to 2 days after treatment is started. Make sure to take all the antibiotics for the full number of days until they are gone. Keep taking the medicine even if your symptoms go away.  Home care  Follow these tips:    Limit the use of the part of your body with cellulitis.     If the infection is on your leg, keep your leg raised while sitting. This will help to reduce swelling.    Take all of the antibiotic medicine exactly as directed until it is gone. Do not miss any doses, especially during the first 7 days. Don t stop taking the medicine when your symptoms get better.    Keep the affected area clean and dry.    Wash your hands with soap and warm water before and after touching your skin. Anyone else who touches your skin should also wash his or her hands. Don't share towels.  Follow-up care  Follow up with your healthcare provider, or as advised. If your infection does not go away on the first antibiotic, your healthcare provider will prescribe a different one.  When to seek medical advice  Call your healthcare provider right away if any of these occur:    Red areas that spread    Swelling or pain that gets worse    Fluid leaking from the skin (pus)    Fever higher of 100.4  F (38.0  C) or higher after 2 days  on antibiotics  Date Last Reviewed: 9/1/2016 2000-2017 [employer]. [address] [place] [address], [place] [address]. All rights reserved. This information is not intended as a substitute for professional medical care. Always follow your healthcare professional's instructions.

## 2018-01-15 NOTE — ED AVS SNAPSHOT
[facility] Emergency Department    [address] [address]    Phone:  [phone]    Fax:  [phone]                                       Chet Byrd   MRN: [id number]    Department:  [facility] Emergency Department   Date of Visit:  1/15/2018           Patient Information     Date Of Birth          1995        Your diagnoses for this visit were:     Cellulitis and abscess of toe of left foot        You were seen by Chu Lopez DO.      Follow-up Information     Follow up with [facility] Emergency Department. Go in 3 days.    Specialty:  EMERGENCY MEDICINE    Why:  Followup for reevaluation if symptoms persist.    Contact information:    [address] [place] [id number] [phone]    Additional information:    The medical center is located at   [address]. ([address] in [place], [place] miles north   of [place]).        Discharge Instructions         Cellulitis  Cellulitis is an infection of the deep layers of skin. A break in the skin, such as a cut or scratch, can let bacteria under the skin. If the bacteria get to deep layers of the skin, it can be serious. If not treated, cellulitis can get into the bloodstream and lymph nodes. The infection can then spread throughout the body. This causes serious illness.  Cellulitis causes the affected skin to become red, swollen, warm, and sore. The reddened areas have a visible border. An open sore may leak fluid (pus). You may have a fever, chills, and pain.  Cellulitis is treated with antibiotics taken for 7 to 10 days. An open sore may be cleaned and covered with cool wet gauze. Symptoms should get better 1 to 2 days after treatment is started. Make sure to take all the antibiotics for the full number of days until they are gone. Keep taking the medicine even if your symptoms go away.  Home care  Follow these tips:    Limit the use of the part of your body with  cellulitis.     If the infection is on your leg, keep your leg raised while sitting. This will help to reduce swelling.    Take all of the antibiotic medicine exactly as directed until it is gone. Do not miss any doses, especially during the first 7 days. Don t stop taking the medicine when your symptoms get better.    Keep the affected area clean and dry.    Wash your hands with soap and warm water before and after touching your skin. Anyone else who touches your skin should also wash his or her hands. Don't share towels.  Follow-up care  Follow up with your healthcare provider, or as advised. If your infection does not go away on the first antibiotic, your healthcare provider will prescribe a different one.  When to seek medical advice  Call your healthcare provider right away if any of these occur:    Red areas that spread    Swelling or pain that gets worse    Fluid leaking from the skin (pus)    Fever higher of 100.4  F (38.0  C) or higher after 2 days on antibiotics  Date Last Reviewed: 9/1/2016 2000-2017 [employer]. [address] [place] [address]. All rights reserved. This information is not intended as a substitute for professional medical care. Always follow your healthcare professional's instructions.          24 Hour Appointment Hotline       To make an appointment at any [facility] clinic, call [phone] ([phone]). If you don't have a family doctor or clinic, we will help you find one. [facility] clinics are conveniently located to serve the needs of you and your family.             Review of your medicines      START taking        Dose / Directions Last dose taken    cephALEXin 500 MG capsule   Commonly known as:  KEFLEX   Dose:  500 mg   Quantity:  40 capsule        Take 1 capsule (500 mg) by mouth 4 times daily   Refills:  0        sulfamethoxazole-trimethoprim 800-160 MG per tablet   Commonly known as:  BACTRIM DS   Dose:  1 tablet   Quantity:  20 tablet         Take 1 tablet by mouth 2 times daily for 10 days   Refills:  0          Our records show that you are taking the medicines listed below. If these are incorrect, please call your family doctor or clinic.        Dose / Directions Last dose taken    cyclobenzaprine 10 MG tablet   Commonly known as:  FLEXERIL   Dose:  10 mg   Quantity:  30 tablet        Take 1 tablet (10 mg) by mouth 3 times daily as needed for muscle spasms   Refills:  0        ketorolac 10 MG tablet   Commonly known as:  TORADOL   Dose:  10 mg   Quantity:  40 tablet        Take 1 tablet (10 mg) by mouth every 6 hours as needed for moderate pain   Refills:  1        valACYclovir 1000 mg tablet   Commonly known as:  VALTREX   Dose:  1000 mg   Quantity:  21 tablet        Take 1 tablet (1,000 mg) by mouth 3 times daily   Refills:  0                Prescriptions were sent or printed at these locations (2 Prescriptions)                   [facility] [place], [place] - [address] [facility] [facility]   [address] [facility] [address]    Telephone:  [phone]   Fax:  [phone]   Hours:                  E-Prescribed (2 of 2)         sulfamethoxazole-trimethoprim (BACTRIM DS) 800-160 MG per tablet               cephALEXin (KEFLEX) 500 MG capsule                Procedures and tests performed during your visit     Abscess Culture Aerobic Bacterial    Foot XR, G/E 3 views, left      Orders Needing Specimen Collection     None      Pending Results     Date and Time Order Name Status Description    1/15/2018 0031 Foot XR, G/E 3 views, left Preliminary             Pending Culture Results     No orders found from 1/13/2018 to 1/16/2018.            Pending Results Instructions     If you had any lab results that were not finalized at the time of your Discharge, you can call the ED Lab Result RN at [phone]. You will be contacted by this team for any positive Lab results or changes in treatment. The nurses are available 7 days a week from 10A to 6:30P.  You  "can leave a message 24 hours per day and they will return your call.        Test Results From Your Hospital Stay        1/15/2018  1:10 AM      Narrative     XR FOOT LT G/E 3 VW  1/15/2018 12:54 AM      HISTORY: Pain and swelling of second toe.     COMPARISON: None.        Impression     IMPRESSION: No acute fracture or dislocation.                Thank you for choosing [facility]       Thank you for choosing [facility] for your care. Our goal is always to provide you with excellent care. Hearing back from our patients is one way we can continue to improve our services. Please take a few minutes to complete the written survey that you may receive in the mail after you visit with us. Thank you!        [employer] Information     [employer] lets you send messages to your doctor, view your test results, renew your prescriptions, schedule appointments and more. To sign up, go to [email or web address] . Click on \"Log in\" on the left side of the screen, which will take you to the Welcome page. Then click on \"Sign up Now\" on the right side of the page.     You will be asked to enter the access code listed below, as well as some personal information. Please follow the directions to create your username and password.     Your access code is: [email or web address]  Expires: 4/15/2018  1:29 AM     Your access code will  in 90 days. If you need help or a new code, please call your [facility] clinic or [phone].        Care EveryWhere ID     This is your Care EveryWhere ID. This could be used by other organizations to access your [facility] medical records  [id number]        Equal Access to Services     [facility] AH: Hadii jhony peterson hadasho Sogabrielaali, waaxda luqadaha, qaybta kaalmada adeegyada, faiza freeman . So [place] [phone].    ATENCIÓN: Si habla español, tiene a bettencourt disposición servicios gratuitos de asistencia lingüística. Llame al [phone].    We comply with applicable federal civil rights laws and " [place] laws. We do not discriminate on the basis of race, color, national origin, age, disability, sex, sexual orientation, or gender identity.            After Visit Summary       This is your record. Keep this with you and show to your community pharmacist(s) and doctor(s) at your next visit.

## 2018-01-17 ENCOUNTER — TELEPHONE (OUTPATIENT)
Dept: EMERGENCY MEDICINE | Facility: CLINIC | Age: 23
End: 2018-01-17

## 2018-01-17 LAB
BACTERIA SPEC CULT: ABNORMAL
Lab: ABNORMAL
SPECIMEN SOURCE: ABNORMAL

## 2018-01-17 NOTE — TELEPHONE ENCOUNTER
[facility] Emergency Department Lab result notification:    [facility] ED lab result protocol used  General Culture    Reason for call  Notify of lab results, assess symptoms,  review ED providers recommendations/discharge instructions (if necessary) and advise per ED lab result f/u protocol    Lab Result  Final Abscess Culture Report on 1/17/18  [facility] ED discharge antibiotic's: Cephalexin (Keflex) 500 mg capsule, 1 capsule (500 mg) by mouth 4 times daily for 10 days AND Sulfamethoxazole-Trimethoprim (Bactrim DS, Septra DS) 800-160 mg PO tablet,  1 tablet by mouth 2 times daily for 10 days  Bacteria #1: Moderate growth Staphylococcus aureus  is [SUSCEPTIBLE] to ED discharge antibiotic(s).   Incision and Drainage performed in [facility] ED [Yes or No]: YES           Recommendations in treatment per  ED Lab result protocol.    Information table from ED Provider visit on 1/15/18  Symptoms reported at ED visit (Chief complaint, HPI) Chet Byrd is a 22 year old male who presents for evaluation of left second toe pain and swelling.  Patient explains that over the past 3 days he has developed gradually increasing pain and swelling along the medial aspect of his toe.  He had been drinking a significant amount of alcohol the last couple days well ice fishing in [place], recalls stubbing his toes at some point but does not recall the specifics regarding the incident.  Since the swelling began he has also developed what appears to be a blood blister on the toe.  He denies recent fever/chills, history of skin infections, or other skin lesions   ED providers Impression and Plan (applicable information) Chet Byrd is a 22 year old male who presents to the department for evaluation of pain and swelling of left second toe.  He admits to using alcohol over the weekend and believes that he stubbed his toes at some point but does not know if that is the etiology of his painful toe.  Radiograph does  not identify fracture or bony pathology.  On clinical examination it looks as though the patient has a warm tender cellulitic appearing rash with central hemorrhagic bulla.  Verbal consent was obtained and the bulla was incised, mixture of bloody and purulent material drained, ulcer has been sent.  Afterwards antibiotic ointment was applied and the wound was dressed to prevent friction with left great toe. He was instructed to remove the bandage within 24 hours and apply OTC antibiotic ointment twice daily while healing.  Given his surrounding cellulitis, he is to be started on oral antibiotics Keflex and Bactrim for coverage of MRSA.  Tdap updated.     It is uncertain how the infection began, possibly due to traumatic skin injury.  Although the wound was thoroughly cleaned with Betadine and he has been started on oral antibiotics, he has been instructed to monitor the toe closely for resolution of infection.  I made it clear that despite measures that infections can progress so he has been instructed to return to the emergency department for reevaluation of increasing pain, redness, discharge, red streaking lines/lymphangitis, or fever. If any are present, they are to return immediately to the emergency department.   Miscellaneous information N/A     RN Assessment (Patient s current Symptoms), include time called.  [Insert Left message here if message left]  No answer at 10:33 am, no voicemail.      PCP follow-up Questions asked: NO    Letty Pedersen RN    [facility] RN  Lung Nodule and ED Lab Results F/U RN  Epic pool (ED late result f/u RN) : [phone] [id number]  Ph # [phone]    Copy of Lab result   Order   Abscess Culture Aerobic Bacterial [[id number]] (Order [id number])   Exam Information   Exam Date Exam Time Accession # Results    1/15/18  1:22 AM [id number]    Component Results   Component Collected Lab   Specimen Description 01/15/2018  1:22    Left second Toe   Special Requests 01/15/2018   1:22 AM 75   Specimen collected in eSwab transport (white cap)   Culture Micro (Abnormal) 01/15/2018  1:22    Moderate growth   Staphylococcus aureus   This isolate is presumed to be clindamycin resistant based on detection of inducible   clindamycin resistance. Erythromycin and clindamycin are resistant, therefore, they are   not recommended for use.      Culture & Susceptibility   STAPHYLOCOCCUS AUREUS   Antibiotic Interpretation Sensitivity Unit Method Status   CIPROFLOXACIN Sensitive <=0.5 ug/mL SILVINO Final   CLINDAMYCIN Resistant  ug/mL SILVINO Final   ERYTHROMYCIN Resistant >=8 ug/mL SILVINO Final   GENTAMICIN Sensitive <=0.5 ug/mL SILVINO Final   LEVOFLOXACIN Sensitive 0.25 ug/mL SILVINO Final   OXACILLIN Sensitive <=0.25 ug/mL SILVINO Final   PENICILLIN Resistant  ug/mL SILVINO Final   TETRACYCLINE Sensitive <=1 ug/mL SILVINO Final   Trimethoprim/Sulfa Sensitive <=0.5/9.5 ug/mL SILVINO Final   VANCOMYCIN Sensitive <=0.5 ug/mL SILVINO Final

## 2020-02-15 ENCOUNTER — HOSPITAL ENCOUNTER (EMERGENCY)
Facility: CLINIC | Age: 25
Discharge: HOME | End: 2020-02-15
Attending: EMERGENCY MEDICINE | Admitting: EMERGENCY MEDICINE

## 2020-02-15 VITALS
TEMPERATURE: 98 F | HEART RATE: 68 BPM | RESPIRATION RATE: 16 BRPM | DIASTOLIC BLOOD PRESSURE: 72 MMHG | WEIGHT: 170 LBS | SYSTOLIC BLOOD PRESSURE: 110 MMHG | BODY MASS INDEX: 25.85 KG/M2 | OXYGEN SATURATION: 100 %

## 2020-02-15 DIAGNOSIS — R11.10 VOMITING AND DIARRHEA: ICD-10-CM

## 2020-02-15 DIAGNOSIS — R19.7 VOMITING AND DIARRHEA: ICD-10-CM

## 2020-02-15 DIAGNOSIS — J10.1 INFLUENZA A: ICD-10-CM

## 2020-02-15 LAB
ANION GAP SERPL CALCULATED.3IONS-SCNC: 6 MMOL/L (ref 3–14)
BASOPHILS # BLD AUTO: 0 10E9/L (ref 0–0.2)
BASOPHILS NFR BLD AUTO: 0.2 %
BUN SERPL-MCNC: 11 MG/DL (ref 7–30)
CALCIUM SERPL-MCNC: 9.3 MG/DL (ref 8.5–10.1)
CHLORIDE SERPL-SCNC: 104 MMOL/L (ref 94–109)
CO2 SERPL-SCNC: 25 MMOL/L (ref 20–32)
CREAT SERPL-MCNC: 0.92 MG/DL (ref 0.66–1.25)
DIFFERENTIAL METHOD BLD: ABNORMAL
EOSINOPHIL # BLD AUTO: 0 10E9/L (ref 0–0.7)
EOSINOPHIL NFR BLD AUTO: 0 %
ERYTHROCYTE [DISTWIDTH] IN BLOOD BY AUTOMATED COUNT: 12.3 % (ref 10–15)
FLUAV+FLUBV AG SPEC QL: NEGATIVE
FLUAV+FLUBV AG SPEC QL: POSITIVE
GFR SERPL CREATININE-BSD FRML MDRD: >90 ML/MIN/{1.73_M2}
GLUCOSE SERPL-MCNC: 85 MG/DL (ref 70–99)
HCT VFR BLD AUTO: 45.5 % (ref 40–53)
HGB BLD-MCNC: 16.3 G/DL (ref 13.3–17.7)
IMM GRANULOCYTES # BLD: 0 10E9/L (ref 0–0.4)
IMM GRANULOCYTES NFR BLD: 0.2 %
LYMPHOCYTES # BLD AUTO: 0.9 10E9/L (ref 0.8–5.3)
LYMPHOCYTES NFR BLD AUTO: 20.1 %
MCH RBC QN AUTO: 29.5 PG (ref 26.5–33)
MCHC RBC AUTO-ENTMCNC: 35.8 G/DL (ref 31.5–36.5)
MCV RBC AUTO: 82 FL (ref 78–100)
MONOCYTES # BLD AUTO: 0.8 10E9/L (ref 0–1.3)
MONOCYTES NFR BLD AUTO: 18 %
NEUTROPHILS # BLD AUTO: 2.8 10E9/L (ref 1.6–8.3)
NEUTROPHILS NFR BLD AUTO: 61.5 %
NRBC # BLD AUTO: 0 10*3/UL
NRBC BLD AUTO-RTO: 0 /100
PLATELET # BLD AUTO: 143 10E9/L (ref 150–450)
POTASSIUM SERPL-SCNC: 3.7 MMOL/L (ref 3.4–5.3)
RBC # BLD AUTO: 5.53 10E12/L (ref 4.4–5.9)
SODIUM SERPL-SCNC: 135 MMOL/L (ref 133–144)
SPECIMEN SOURCE: ABNORMAL
WBC # BLD AUTO: 4.6 10E9/L (ref 4–11)

## 2020-02-15 PROCEDURE — 85025 COMPLETE CBC W/AUTO DIFF WBC: CPT | Performed by: EMERGENCY MEDICINE

## 2020-02-15 PROCEDURE — 96374 THER/PROPH/DIAG INJ IV PUSH: CPT

## 2020-02-15 PROCEDURE — 25800030 ZZH RX IP 258 OP 636: Performed by: EMERGENCY MEDICINE

## 2020-02-15 PROCEDURE — 96361 HYDRATE IV INFUSION ADD-ON: CPT

## 2020-02-15 PROCEDURE — 96375 TX/PRO/DX INJ NEW DRUG ADDON: CPT

## 2020-02-15 PROCEDURE — 99284 EMERGENCY DEPT VISIT MOD MDM: CPT | Mod: Z6 | Performed by: EMERGENCY MEDICINE

## 2020-02-15 PROCEDURE — 80048 BASIC METABOLIC PNL TOTAL CA: CPT | Performed by: EMERGENCY MEDICINE

## 2020-02-15 PROCEDURE — 99284 EMERGENCY DEPT VISIT MOD MDM: CPT | Mod: 25

## 2020-02-15 PROCEDURE — 25000128 H RX IP 250 OP 636: Performed by: EMERGENCY MEDICINE

## 2020-02-15 PROCEDURE — 87804 INFLUENZA ASSAY W/OPTIC: CPT | Performed by: EMERGENCY MEDICINE

## 2020-02-15 RX ORDER — ONDANSETRON 2 MG/ML
4 INJECTION INTRAMUSCULAR; INTRAVENOUS EVERY 30 MIN PRN
Status: DISCONTINUED | OUTPATIENT
Start: 2020-02-15 | End: 2020-02-15 | Stop reason: HOSPADM

## 2020-02-15 RX ORDER — ONDANSETRON 4 MG/1
4 TABLET, ORALLY DISINTEGRATING ORAL EVERY 8 HOURS PRN
Qty: 7 TABLET | Refills: 0 | Status: SHIPPED | OUTPATIENT
Start: 2020-02-15 | End: 2022-01-03

## 2020-02-15 RX ORDER — SODIUM CHLORIDE, SODIUM LACTATE, POTASSIUM CHLORIDE, CALCIUM CHLORIDE 600; 310; 30; 20 MG/100ML; MG/100ML; MG/100ML; MG/100ML
INJECTION, SOLUTION INTRAVENOUS ONCE
Status: COMPLETED | OUTPATIENT
Start: 2020-02-15 | End: 2020-02-15

## 2020-02-15 RX ORDER — KETOROLAC TROMETHAMINE 15 MG/ML
10 INJECTION, SOLUTION INTRAMUSCULAR; INTRAVENOUS
Status: COMPLETED | OUTPATIENT
Start: 2020-02-15 | End: 2020-02-15

## 2020-02-15 RX ADMIN — KETOROLAC TROMETHAMINE 10 MG: 15 INJECTION, SOLUTION INTRAMUSCULAR; INTRAVENOUS at 15:08

## 2020-02-15 RX ADMIN — ONDANSETRON 4 MG: 2 INJECTION INTRAMUSCULAR; INTRAVENOUS at 14:20

## 2020-02-15 RX ADMIN — SODIUM CHLORIDE, POTASSIUM CHLORIDE, SODIUM LACTATE AND CALCIUM CHLORIDE: 600; 310; 30; 20 INJECTION, SOLUTION INTRAVENOUS at 14:11

## 2020-02-15 ASSESSMENT — ENCOUNTER SYMPTOMS
ACTIVITY CHANGE: 1
CARDIOVASCULAR NEGATIVE: 1
VOMITING: 1
NAUSEA: 1
MYALGIAS: 1
NEUROLOGICAL NEGATIVE: 1
EYES NEGATIVE: 1
HEMATOLOGIC/LYMPHATIC NEGATIVE: 1
FEVER: 1
PSYCHIATRIC NEGATIVE: 1
ALLERGIC/IMMUNOLOGIC NEGATIVE: 1
DIARRHEA: 1
ARTHRALGIAS: 1
APPETITE CHANGE: 1
ENDOCRINE NEGATIVE: 1
COUGH: 1

## 2020-02-15 NOTE — ED AVS SNAPSHOT
[facility] Emergency Department  [address] [facility] [address] [address]  Phone:  [phone]  Fax:  [phone]                                    Chet Ham   MRN: [id number]    Department:  [facility] Emergency Department   Date of Visit:  2/15/2020           After Visit Summary Signature Page    I have received my discharge instructions, and my questions have been answered. I have discussed any challenges I see with this plan with the nurse or doctor.    ..........................................................................................................................................  Patient/Patient Representative Signature      ..........................................................................................................................................  Patient Representative Print Name and Relationship to Patient    ..................................................               ................................................  Date                                   Time    ..........................................................................................................................................  Reviewed by Signature/Title    ...................................................              ..............................................  Date                                               Time          22EPIC Rev 08/18

## 2020-02-15 NOTE — ED TRIAGE NOTES
Vomiting, diarrhea started yesterday, had had cough prior for 3 days. C/o body aches, lower abd pain.

## 2020-02-15 NOTE — LETTER
02/15/20      To Whom it may concern:    Noelle Ham was in our Emergency Department today, 02/15/20. with a patient who needed their assistance.  Please excuse her from missing work due to her symptoms and visit in the emergency department.  This work excuse is valid until February 18, 2020      Sincerely,        Jatinder Rock MD, FACEP

## 2020-02-15 NOTE — DISCHARGE INSTRUCTIONS
1) your rapid flu test is positive for influenza A.    2) we have discussed Tamiflu therapy because you currently have diarrhea we have elected to hold on giving you Tamiflu.    3) you are given medication to help with nausea and vomiting.  Continue to drink fluids including Gatorade and propel    4) return to the department to be reevaluated if symptoms worsen.

## 2020-02-15 NOTE — ED PROVIDER NOTES
History     Chief Complaint   Patient presents with     Nausea, Vomiting, & Diarrhea     Cough     Generalized Body Aches     HPI  Chet Ham is a 24 year old male who presents with report of nausea, vomiting and diarrhea over the last 24 hours and report that over the last 3 days he has had a cough, now with generalized body aches and lower abdominal pain. Green, non-bloody stool. He reports a headache. Patient is drinking fluids but reports decreased appetite. His wife and 2 year old daughter have similar symptoms. No recent travel. Patient did not receive and influenza immunization this season. Patient works in [employer].     Social History: Lives in [place], [place]. Here in the ED with his wife who is being seeing for similar symptoms.     Allergies:  No Known Allergies    Problem List:    Patient Active Problem List    Diagnosis Date Noted     Syncope, unspecified syncope type 05/24/2017     Priority: Medium     Sinus bradycardia 05/24/2017     Priority: Medium        Past Medical History:    No past medical history on file.    Past Surgical History:    No past surgical history on file.    Family History:    Family History   Problem Relation Age of Onset     Diabetes Mother      Diabetes Father      Coronary Artery Disease Maternal Grandfather         Multiple stents.     Brain Tumor Maternal Grandfather        Social History:  Marital Status:  Single [1]  Social History     Tobacco Use     Smoking status: Current Every Day Smoker     Packs/day: 0.50     Years: 8.00     Pack years: 4.00     Types: Cigarettes     Smokeless tobacco: Never Used   Substance Use Topics     Alcohol use: No     Comment: None as of lately.  Previous use.  5-24-17 visit.     Drug use: Not on file        Medications:    ondansetron (ZOFRAN ODT) 4 MG ODT tab  cephALEXin (KEFLEX) 500 MG capsule  cyclobenzaprine (FLEXERIL) 10 MG tablet  ketorolac (TORADOL) 10 MG tablet  valACYclovir (VALTREX) 1000 mg tablet          Review of Systems    Constitutional: Positive for activity change, appetite change and fever.   HENT: Negative.    Eyes: Negative.    Respiratory: Positive for cough.    Cardiovascular: Negative.    Gastrointestinal: Positive for diarrhea, nausea and vomiting.   Endocrine: Negative.    Genitourinary: Negative.    Musculoskeletal: Positive for arthralgias and myalgias.   Skin: Negative.    Allergic/Immunologic: Negative.    Neurological: Negative.    Hematological: Negative.    Psychiatric/Behavioral: Negative.    All other systems reviewed and are negative.      Physical Exam   BP: 126/84  Pulse: 88  Temp: 98  F (36.7  C)  Resp: 20  Weight: 77.1 kg (170 lb)  SpO2: 97 %      Physical Exam  Constitutional:       Appearance: He is ill-appearing. He is not diaphoretic.   HENT:      Head: Normocephalic and atraumatic.      Nose: Nose normal. No congestion or rhinorrhea.      Mouth/Throat:      Mouth: Mucous membranes are moist.   Neck:      Musculoskeletal: Normal range of motion and neck supple. No neck rigidity or muscular tenderness.      Vascular: No carotid bruit.   Cardiovascular:      Rate and Rhythm: Normal rate.      Pulses: Normal pulses.      Heart sounds: Normal heart sounds. No murmur. No friction rub. No gallop.    Pulmonary:      Effort: Pulmonary effort is normal. No respiratory distress.      Breath sounds: Normal breath sounds. No stridor. No wheezing, rhonchi or rales.   Chest:      Chest wall: No tenderness.   Abdominal:      General: Abdomen is flat. Bowel sounds are normal. There is no distension.      Palpations: Abdomen is soft. There is no mass.      Tenderness: There is no abdominal tenderness. There is no right CVA tenderness, left CVA tenderness, guarding or rebound.      Hernia: No hernia is present.   Lymphadenopathy:      Cervical: No cervical adenopathy.   Skin:     Capillary Refill: Capillary refill takes less than 2 seconds.   Neurological:      General: No focal deficit present.      Mental Status: He is  alert and oriented to person, place, and time.      Cranial Nerves: No cranial nerve deficit.      Sensory: No sensory deficit.      Motor: No weakness.      Gait: Gait normal.      Deep Tendon Reflexes: Reflexes normal.   Psychiatric:         Mood and Affect: Mood normal.         Behavior: Behavior normal.         Thought Content: Thought content normal.         Judgment: Judgment normal.         ED Course        Procedures               Critical Care time:  none               ED medications:  Medications   lactated ringers infusion ( Intravenous Stopped 2/15/20 1649)   ketorolac (TORADOL) injection 10 mg (10 mg Intravenous Given 2/15/20 1508)         ED Vitals:  Vitals:    02/15/20 1334 02/15/20 1509 02/15/20 1659   BP: 126/84 109/68 110/72   Pulse: 88 68    Resp: 20 16    Temp: 98  F (36.7  C)     TempSrc: Oral     SpO2: 97% 100%    Weight: 77.1 kg (170 lb)         ED Labs and imaging::  Results for orders placed or performed during the hospital encounter of 02/15/20 (from the past 24 hour(s))   CBC with platelets differential   Result Value Ref Range    WBC 4.6 4.0 - 11.0 10e9/L    RBC Count 5.53 4.4 - 5.9 10e12/L    Hemoglobin 16.3 13.3 - 17.7 g/dL    Hematocrit 45.5 40.0 - 53.0 %    MCV 82 78 - 100 fl    MCH 29.5 26.5 - 33.0 pg    MCHC 35.8 31.5 - 36.5 g/dL    RDW 12.3 10.0 - 15.0 %    Platelet Count 143 (L) 150 - 450 10e9/L    Diff Method Automated Method     % Neutrophils 61.5 %    % Lymphocytes 20.1 %    % Monocytes 18.0 %    % Eosinophils 0.0 %    % Basophils 0.2 %    % Immature Granulocytes 0.2 %    Nucleated RBCs 0 0 /100    Absolute Neutrophil 2.8 1.6 - 8.3 10e9/L    Absolute Lymphocytes 0.9 0.8 - 5.3 10e9/L    Absolute Monocytes 0.8 0.0 - 1.3 10e9/L    Absolute Eosinophils 0.0 0.0 - 0.7 10e9/L    Absolute Basophils 0.0 0.0 - 0.2 10e9/L    Abs Immature Granulocytes 0.0 0 - 0.4 10e9/L    Absolute Nucleated RBC 0.0    Basic metabolic panel   Result Value Ref Range    Sodium 135 133 - 144 mmol/L     Potassium 3.7 3.4 - 5.3 mmol/L    Chloride 104 94 - 109 mmol/L    Carbon Dioxide 25 20 - 32 mmol/L    Anion Gap 6 3 - 14 mmol/L    Glucose 85 70 - 99 mg/dL    Urea Nitrogen 11 7 - 30 mg/dL    Creatinine 0.92 0.66 - 1.25 mg/dL    GFR Estimate >90 >60 mL/min/[1.73_m2]    GFR Estimate If Black >90 >60 mL/min/[1.73_m2]    Calcium 9.3 8.5 - 10.1 mg/dL   Influenza A/B antigen   Result Value Ref Range    Influenza A/B Agn Specimen Nasopharyngeal     Influenza A Positive (A) NEG^Negative    Influenza B Negative NEG^Negative           Assessments & Plan (with Medical Decision Making)   Clinical impression: 24-year-old male who presented with report of vomiting diarrhea within 24 hours, 3-day history of cough generalized body aches and lower abdominal discomfort.  Sick household contacts with both spouse and daughter with similar symptoms.  No international travel.  No active prescriptions.  Smoker works in [employer].  Did not receive a flu vaccine this season.  On my exam he appeared ill but was nontoxic.  Lungs were clear to auscultation.  He was afebrile hemodynamically normal on arrival.  He is discharged home with outpatient care and supportive care measures with close outpatient follow-up for reevaluation in the next 3 to 5 days      ED course and Plan:  I reviewed the patient's medical records.  Work-up in the department was reassuring with normal labs specifically normal electrolytes and a normal hemogram.  He was given Toradol for abdominal cramping and discomfort and given IV fluids and Zofran.  He was reexamined after therapeutic measures rendered in the department. Work-up in the department revealed positive rapid influenza for influenza A.  Stable hemogram normal electrolytes.  We discussed Tamiflu therapy with positive rapid influenza with sick household contacts.  Because he currently has diarrhea he is young and otherwise healthy we discussed that the risk of developing side effects outweighed the benefit of  reducing duration of symptoms.  Patient is comfortable going home without Tamiflu.  We discussed oral rehydration techniques and also reviewed reasons to return to the department to be reevaluated.  Patient and his spouse expressed comfort, understanding and agreement of plan of care.  Follow-up in clinic for recheck in the next 3 to 5 days          Disclaimer: This note consists of symbols derived from keyboarding, dictation and/or voice recognition software. As a result, there may be errors in the script that have gone undetected. Please consider this when interpreting information found in this chart.  I have reviewed the nursing notes.    I have reviewed the findings, diagnosis, plan and need for follow up with the patient.       Discharge Medication List as of 2/15/2020  4:54 PM      START taking these medications    Details   ondansetron (ZOFRAN ODT) 4 MG ODT tab Take 1 tablet (4 mg) by mouth every 8 hours as needed for nausea or vomiting, Disp-7 tablet, R-0, Local Print             Final diagnoses:   Influenza A   Vomiting and diarrhea     This document serves as a record of the services and decisions personally performed and made by Michael Rock. The HPI was created on his behalf by Mary Jo Carey, a trained medical scribe. The creation of the HPI is based on the provider's statements to the medical scribe.     Mary Jo Carey 2:54 PM February 15, 2020    Provider:   The information in the HPI created by the medical scribe for me, accurately reflects the services I personally performed and the decisions made by me. The documentation of the review of systems, physical exam, and medical decision making is written by Michael Rock. I have reviewed and approved this document for accuracy prior to leaving the patient care area.   Michael Rock MD 2:54 PM February 15, 2020     2/15/2020   [facility] EMERGENCY DEPARTMENT     Michael Rock MD  02/16/20 0005

## 2020-03-04 ENCOUNTER — HOSPITAL ENCOUNTER (EMERGENCY)
Facility: CLINIC | Age: 25
Discharge: HOME | End: 2020-03-04
Attending: EMERGENCY MEDICINE | Admitting: EMERGENCY MEDICINE

## 2020-03-04 ENCOUNTER — APPOINTMENT (OUTPATIENT)
Dept: GENERAL RADIOLOGY | Facility: CLINIC | Age: 25
End: 2020-03-04
Attending: EMERGENCY MEDICINE

## 2020-03-04 VITALS
HEART RATE: 61 BPM | TEMPERATURE: 97.8 F | BODY MASS INDEX: 25.01 KG/M2 | DIASTOLIC BLOOD PRESSURE: 73 MMHG | WEIGHT: 165 LBS | RESPIRATION RATE: 18 BRPM | HEIGHT: 68 IN | SYSTOLIC BLOOD PRESSURE: 136 MMHG | OXYGEN SATURATION: 100 %

## 2020-03-04 DIAGNOSIS — S61.432A GUNSHOT WOUND OF LEFT HAND, INITIAL ENCOUNTER: ICD-10-CM

## 2020-03-04 PROCEDURE — 96375 TX/PRO/DX INJ NEW DRUG ADDON: CPT | Mod: 59 | Performed by: EMERGENCY MEDICINE

## 2020-03-04 PROCEDURE — 90471 IMMUNIZATION ADMIN: CPT | Performed by: EMERGENCY MEDICINE

## 2020-03-04 PROCEDURE — 90715 TDAP VACCINE 7 YRS/> IM: CPT | Performed by: EMERGENCY MEDICINE

## 2020-03-04 PROCEDURE — 73130 X-RAY EXAM OF HAND: CPT | Mod: LT

## 2020-03-04 PROCEDURE — 99284 EMERGENCY DEPT VISIT MOD MDM: CPT | Mod: 25 | Performed by: EMERGENCY MEDICINE

## 2020-03-04 PROCEDURE — 12041 INTMD RPR N-HF/GENIT 2.5CM/<: CPT | Mod: Z6 | Performed by: EMERGENCY MEDICINE

## 2020-03-04 PROCEDURE — 96374 THER/PROPH/DIAG INJ IV PUSH: CPT | Mod: 59 | Performed by: EMERGENCY MEDICINE

## 2020-03-04 PROCEDURE — 25000128 H RX IP 250 OP 636: Performed by: EMERGENCY MEDICINE

## 2020-03-04 PROCEDURE — 12041 INTMD RPR N-HF/GENIT 2.5CM/<: CPT | Performed by: EMERGENCY MEDICINE

## 2020-03-04 PROCEDURE — 99285 EMERGENCY DEPT VISIT HI MDM: CPT | Mod: 25 | Performed by: EMERGENCY MEDICINE

## 2020-03-04 RX ORDER — CEPHALEXIN 500 MG/1
500 CAPSULE ORAL 4 TIMES DAILY
Qty: 28 CAPSULE | Refills: 0 | Status: SHIPPED | OUTPATIENT
Start: 2020-03-04 | End: 2020-03-11

## 2020-03-04 RX ORDER — CEFAZOLIN SODIUM 2 G/100ML
2 INJECTION, SOLUTION INTRAVENOUS ONCE
Status: COMPLETED | OUTPATIENT
Start: 2020-03-04 | End: 2020-03-04

## 2020-03-04 RX ORDER — HYDROMORPHONE HYDROCHLORIDE 1 MG/ML
0.5 INJECTION, SOLUTION INTRAMUSCULAR; INTRAVENOUS; SUBCUTANEOUS ONCE
Status: COMPLETED | OUTPATIENT
Start: 2020-03-04 | End: 2020-03-04

## 2020-03-04 RX ORDER — HYDROCODONE BITARTRATE AND ACETAMINOPHEN 5; 325 MG/1; MG/1
1 TABLET ORAL EVERY 6 HOURS PRN
Qty: 10 TABLET | Refills: 0 | Status: SHIPPED | OUTPATIENT
Start: 2020-03-04 | End: 2020-03-07

## 2020-03-04 RX ADMIN — CLOSTRIDIUM TETANI TOXOID ANTIGEN (FORMALDEHYDE INACTIVATED), CORYNEBACTERIUM DIPHTHERIAE TOXOID ANTIGEN (FORMALDEHYDE INACTIVATED), BORDETELLA PERTUSSIS TOXOID ANTIGEN (GLUTARALDEHYDE INACTIVATED), BORDETELLA PERTUSSIS FILAMENTOUS HEMAGGLUTININ ANTIGEN (FORMALDEHYDE INACTIVATED), BORDETELLA PERTUSSIS PERTACTIN ANTIGEN, AND BORDETELLA PERTUSSIS FIMBRIAE 2/3 ANTIGEN 0.5 ML: 5; 2; 2.5; 5; 3; 5 INJECTION, SUSPENSION INTRAMUSCULAR at 15:14

## 2020-03-04 RX ADMIN — HYDROMORPHONE HYDROCHLORIDE 0.5 MG: 1 INJECTION, SOLUTION INTRAMUSCULAR; INTRAVENOUS; SUBCUTANEOUS at 15:11

## 2020-03-04 RX ADMIN — CEFAZOLIN SODIUM 2 G: 2 INJECTION, SOLUTION INTRAVENOUS at 15:12

## 2020-03-04 ASSESSMENT — MIFFLIN-ST. JEOR: SCORE: 1712.94

## 2020-03-04 NOTE — ED AVS SNAPSHOT
[facility] Emergency Department  [address] [address]  Phone:  [phone]  Fax:  [phone]                                    Chet Ham   MRN: [id number]    Department:  [facility] Emergency Department   Date of Visit:  3/4/2020           After Visit Summary Signature Page    I have received my discharge instructions, and my questions have been answered. I have discussed any challenges I see with this plan with the nurse or doctor.    ..........................................................................................................................................  Patient/Patient Representative Signature      ..........................................................................................................................................  Patient Representative Print Name and Relationship to Patient    ..................................................               ................................................  Date                                   Time    ..........................................................................................................................................  Reviewed by Signature/Title    ...................................................              ..............................................  Date                                               Time          22EPIC Rev 08/18
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
## 2020-03-04 NOTE — DISCHARGE INSTRUCTIONS
Dressing changes 2-3 times daily    Follow-up [facility] orthopedics for further evaluation    Return here for redness, swelling, purulent discharge, fever or other concern    Cephalexin as prescribed, Norco as needed for severe pain, ibuprofen, ice elevate.

## 2020-06-09 ENCOUNTER — HOSPITAL ENCOUNTER (EMERGENCY)
Facility: CLINIC | Age: 25
Discharge: HOME | End: 2020-06-09
Attending: FAMILY MEDICINE | Admitting: FAMILY MEDICINE
Payer: OTHER MISCELLANEOUS

## 2020-06-09 VITALS
TEMPERATURE: 99.3 F | BODY MASS INDEX: 25.09 KG/M2 | DIASTOLIC BLOOD PRESSURE: 70 MMHG | SYSTOLIC BLOOD PRESSURE: 130 MMHG | RESPIRATION RATE: 18 BRPM | OXYGEN SATURATION: 98 % | WEIGHT: 165 LBS

## 2020-06-09 DIAGNOSIS — S05.01XA ABRASION OF RIGHT CORNEA, INITIAL ENCOUNTER: ICD-10-CM

## 2020-06-09 PROCEDURE — 25000125 ZZHC RX 250: Performed by: FAMILY MEDICINE

## 2020-06-09 PROCEDURE — 25000132 ZZH RX MED GY IP 250 OP 250 PS 637: Performed by: FAMILY MEDICINE

## 2020-06-09 PROCEDURE — 99284 EMERGENCY DEPT VISIT MOD MDM: CPT | Mod: Z6 | Performed by: FAMILY MEDICINE

## 2020-06-09 PROCEDURE — 99283 EMERGENCY DEPT VISIT LOW MDM: CPT | Performed by: FAMILY MEDICINE

## 2020-06-09 RX ORDER — POLYMYXIN B SULFATE AND TRIMETHOPRIM 1; 10000 MG/ML; [USP'U]/ML
2 SOLUTION OPHTHALMIC ONCE
Status: COMPLETED | OUTPATIENT
Start: 2020-06-09 | End: 2020-06-09

## 2020-06-09 RX ORDER — TETRACAINE HYDROCHLORIDE 5 MG/ML
1-2 SOLUTION OPHTHALMIC ONCE
Status: COMPLETED | OUTPATIENT
Start: 2020-06-09 | End: 2020-06-09

## 2020-06-09 RX ADMIN — TETRACAINE HYDROCHLORIDE 2 DROP: 5 SOLUTION OPHTHALMIC at 11:18

## 2020-06-09 RX ADMIN — POLYMYXIN B SULFATE AND TRIMETHOPRIM SULFATE 2 DROP: 10000; 1 SOLUTION/ DROPS OPHTHALMIC at 11:57
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
## 2020-06-09 NOTE — ED AVS SNAPSHOT
[facility] Emergency Department  [address] [address]  Phone:  [phone]  Fax:  [phone]                                    Chet Ham   MRN: [id number]    Department:  [facility] Emergency Department   Date of Visit:  6/9/2020           After Visit Summary Signature Page    I have received my discharge instructions, and my questions have been answered. I have discussed any challenges I see with this plan with the nurse or doctor.    ..........................................................................................................................................  Patient/Patient Representative Signature      ..........................................................................................................................................  Patient Representative Print Name and Relationship to Patient    ..................................................               ................................................  Date                                   Time    ..........................................................................................................................................  Reviewed by Signature/Title    ...................................................              ..............................................  Date                                               Time          22EPIC Rev 08/18

## 2020-06-09 NOTE — LETTER
[facility] EMERGENCY DEPARTMENT  [address] [address]  [phone]      2020    Chet Ham  [address]   [place] [address]  [phone] (home)     : 1995      To Whom it may concern:    Chet Ham was seen in our Emergency Department today, 2020 for an injury that was reported to be work related.      The patient may have difficulty with vision and pain involving the right eye over the next 48 hours.  His work should be augmented based on his ability to perform it with symptoms.    Sincerely,    Ricky Madrigal MD

## 2020-06-09 NOTE — DISCHARGE INSTRUCTIONS
Return to the Emergency Room if the following occurs:     Severely worsened pain, fever >101, concerning changes in vision, or for any concern at anytime.    Or, follow-up with the following provider as we discussed:     Return to the [facility] if not improved over the next 48 hours.    Medications discussed:    Polytrim 2 drops into the right eye three times daily for three days.  Tetracaine 2 drops every two hours as needed for pain, not to be used beyond 24 hours.  Ibuprofen 600 mg every six hours for pain (7 days duration).  Tylenol 1000 mg every six hours for pain (7 days duration).  Therefore, you can alternate these every three hours and do it safely.    If you received pain-relieving or sedating medication during your time in the ER, avoid alcohol, driving automobiles, or working with machinery.  Also, a responsible adult must stay with you.        Call the Nurse Advice Line at [phone] or [phone] for any concern at anytime.

## 2020-07-28 ENCOUNTER — HOSPITAL ENCOUNTER (EMERGENCY)
Facility: CLINIC | Age: 25
Discharge: HOME | End: 2020-07-28
Attending: EMERGENCY MEDICINE | Admitting: EMERGENCY MEDICINE
Payer: OTHER GOVERNMENT

## 2020-07-28 VITALS
BODY MASS INDEX: 25.09 KG/M2 | SYSTOLIC BLOOD PRESSURE: 145 MMHG | OXYGEN SATURATION: 98 % | TEMPERATURE: 99.3 F | HEART RATE: 87 BPM | RESPIRATION RATE: 18 BRPM | DIASTOLIC BLOOD PRESSURE: 65 MMHG | WEIGHT: 165 LBS

## 2020-07-28 DIAGNOSIS — Z20.822 SUSPECTED COVID-19 VIRUS INFECTION: ICD-10-CM

## 2020-07-28 PROCEDURE — C9803 HOPD COVID-19 SPEC COLLECT: HCPCS | Performed by: EMERGENCY MEDICINE

## 2020-07-28 PROCEDURE — U0003 INFECTIOUS AGENT DETECTION BY NUCLEIC ACID (DNA OR RNA); SEVERE ACUTE RESPIRATORY SYNDROME CORONAVIRUS 2 (SARS-COV-2) (CORONAVIRUS DISEASE [COVID-19]), AMPLIFIED PROBE TECHNIQUE, MAKING USE OF HIGH THROUGHPUT TECHNOLOGIES AS DESCRIBED BY CMS-2020-01-R: HCPCS | Performed by: EMERGENCY MEDICINE

## 2020-07-28 PROCEDURE — 99284 EMERGENCY DEPT VISIT MOD MDM: CPT | Mod: Z6 | Performed by: EMERGENCY MEDICINE

## 2020-07-28 PROCEDURE — 99283 EMERGENCY DEPT VISIT LOW MDM: CPT | Performed by: EMERGENCY MEDICINE

## 2020-07-28 ASSESSMENT — ENCOUNTER SYMPTOMS
NECK PAIN: 0
NECK STIFFNESS: 0
DIARRHEA: 0
FEVER: 0
DIFFICULTY URINATING: 0
PALPITATIONS: 0
CHILLS: 0
MYALGIAS: 0
ABDOMINAL PAIN: 0
COUGH: 1
VOMITING: 0
HEADACHES: 0
SHORTNESS OF BREATH: 0
NAUSEA: 0
SORE THROAT: 1
DIAPHORESIS: 0

## 2020-07-28 NOTE — ED AVS SNAPSHOT
[facility] Emergency Department  [address] [address]  Phone:  [phone]  Fax:  [phone]                                    Chet Ham   MRN: [id number]    Department:  [facility] Emergency Department   Date of Visit:  7/28/2020           After Visit Summary Signature Page    I have received my discharge instructions, and my questions have been answered. I have discussed any challenges I see with this plan with the nurse or doctor.    ..........................................................................................................................................  Patient/Patient Representative Signature      ..........................................................................................................................................  Patient Representative Print Name and Relationship to Patient    ..................................................               ................................................  Date                                   Time    ..........................................................................................................................................  Reviewed by Signature/Title    ...................................................              ..............................................  Date                                               Time          22EPIC Rev 08/18

## 2020-07-28 NOTE — DISCHARGE INSTRUCTIONS
"Discharge Instructions for COVID-19 Patients  You have--or may have--COVID-19. Please follow the instructions listed below.   If you have a weakened immune system, discuss with your doctor any other actions you need to take.  How can I protect others?  If you have symptoms (fever, cough, body aches or trouble breathing):  Stay home and away from others (self-isolate) until:  At least 10 days have passed since your symptoms started. And   You've had no fever--and no medicine that reduces fever--for 3 full days (72 hours). And   Your other symptoms have resolved (gotten better).  If you don't show symptoms, but testing showed that you have COVID-19:  Stay home and away from others (self-isolate) until at least 10 days have passed since the date of your first positive COVID-19 test.  During this time  Stay in your own room, even for meals. Use your own bathroom if you can.  Stay away from others in your home. No hugging, kissing or shaking hands. No visitors.  Don't go to work, school or anywhere else.  Clean \"high touch\" surfaces often (doorknobs, counters, handles). Use household cleaning spray or wipes. You'll find a full list of  on the EPA website: www.epa.gov/pesticide-registration/list-n-disinfectants-use-against-sars-cov-2.  Cover your mouth and nose with a mask, tissue or wash cloth to avoid spreading germs.  Wash your hands and face often. Use soap and water.  Caregivers in these groups are at risk for severe illness due to COVID-19:  People 65 years and older  People who live in a nursing home or long-term care facility  People with chronic disease (lung, heart, cancer, diabetes, kidney, liver, immunologic)  People who have a weakened immune system, including those who:  Are in cancer treatment  Take medicine that weakens the immune system, such as corticosteroids  Had a bone marrow or organ transplant  Have an immune deficiency  Have poorly controlled HIV or AIDS  Are obese (body mass index of 40 or " higher)  Smoke regularly  Caregivers should wear gloves while washing dishes, handling laundry and cleaning bedrooms and bathrooms.  Use caution when washing and drying laundry: Don't shake dirty laundry and use the warmest water setting that you can.  For more tips on managing your health at home, go to www.cdc.gov/coronavirus/2019-ncov/downloads/10Things.pdf.  How can I take care of myself at home?  Get lots of rest. Drink extra fluids (unless a doctor has told you not to).  Take Tylenol (acetaminophen) for fever or pain. If you have liver or kidney problems, ask your family doctor if it's okay to take Tylenol.     Adults can take either:  650 mg (two 325 mg pills) every 4 to 6 hours, or   1,000 mg (two 500 mg pills) every 8 hours as needed.  Note: Don't take more than 3,000 mg in one day. Acetaminophen is found in many medicines (both prescribed and over-the-counter medicines). Read all labels to be sure you don't take too much.   For children, check the Tylenol bottle for the right dose. The dose is based on the child's age or weight.  If you have other health problems (like cancer, heart failure, an organ transplant or severe kidney disease): Call your specialty clinic if you don't feel better in the next 2 days.  Know when to call 911. Emergency warning signs include:  Trouble breathing or shortness of breath  Pain or pressure in the chest that doesn't go away  Feeling confused like you haven't felt before, or not being able to wake up  Bluish-colored lips or face  Your doctor may have prescribed a blood thinner medicine. Follow their instructions.  Where can I get more information?  [facility] - About COVID-19:   [email or web address]  CDC - What to Do If You're Sick: www.cdc.gov/coronavirus/2019-ncov/about/steps-when-sick.html  CDC - Ending Home Isolation: www.cdc.gov/coronavirus/2019-ncov/hcp/disposition-in-home-patients.html  CDC - Caring for Someone:  www.cdc.gov/coronavirus/2019-ncov/if-you-are-sick/care-for-someone.html  [facility] - Interim Guidance for Hospital Discharge to Home: www.health.[facility].[place].us/diseases/coronavirus/hcp/hospdischarge.pdf  [facility] clinical trials (COVID-19 research studies): clinicalaffairs.[facility]-clinical-trials  Below are the COVID-19 hotlines at the [place] Department of Health ([facility]). Interpreters are available.  For health questions: Call 925-708-4200 or 1-263.829.6081 (7 a.m. to 7 p.m.)  For questions about schools and childcare: Call 416-043-7966 or 1-817.103.8887 (7 a.m. to 7 p.m.)    For informational purposes only. Not to replace the advice of your health care provider. Clinically reviewed by the Infection Prevention Team.Copyright   2020 [facility]. All rights reserved. [employer] [id number] - 06/20.

## 2020-07-28 NOTE — ED NOTES
States he woke up today with a cough and st. States hie boss told him he needed a covid test before going back to work

## 2020-07-28 NOTE — ED PROVIDER NOTES
History     Chief Complaint   Patient presents with     Cough     sore throat      HPI  Chet Ham is a 24 year old male with history of syncope who presents with one day of cough.  Also has a mild sore throat of same duration.  His boss requested that he gets COVID testing prior to returning to work.  Denies any fever, chills, nausea, vomiting, shortness of breath, abdominal pain, or diarrhea.  Former smoker, social alcohol use.  Not currently take any medications.  No known medication allergies.  Otherwise feeling well.    I conducted this evaluation using two-way, real-time interactive telecommunication technology between the patient and the physician. The patient was offered telemedicine as an option for care delivery and verbally consented to this option. This evaluation was conducted while on site in the emergency department      The patient's PMHx, Surgical Hx, Allergies, and Medications were all reviewed with the patient.    Allergies:  No Known Allergies    Problem List:    Patient Active Problem List    Diagnosis Date Noted     Syncope, unspecified syncope type 2017     Priority: Medium     Sinus bradycardia 2017     Priority: Medium        Past Medical History:    No past medical history on file.    Past Surgical History:    No past surgical history on file.    Family History:    Family History   Problem Relation Age of Onset     Diabetes Mother      Diabetes Father      Coronary Artery Disease Maternal Grandfather         Multiple stents.     Brain Tumor Maternal Grandfather        Social History:  Marital Status:  Single [1]  Social History     Tobacco Use     Smoking status: Former Smoker     Packs/day: 0.50     Years: 8.00     Pack years: 4.00     Types: Cigarettes     Last attempt to quit: 3/2/2020     Years since quittin.4     Smokeless tobacco: Never Used   Substance Use Topics     Alcohol use: No     Comment: None as of lately.  Previous use.  17 visit.     Drug use:  Not on file        Medications:    cephALEXin (KEFLEX) 500 MG capsule  cyclobenzaprine (FLEXERIL) 10 MG tablet  ketorolac (TORADOL) 10 MG tablet  ondansetron (ZOFRAN ODT) 4 MG ODT tab  valACYclovir (VALTREX) 1000 mg tablet          Review of Systems   Constitutional: Negative for chills, diaphoresis and fever.   HENT: Positive for sore throat.    Eyes: Negative for visual disturbance.   Respiratory: Positive for cough. Negative for shortness of breath.    Cardiovascular: Negative for chest pain and palpitations.   Gastrointestinal: Negative for abdominal pain, diarrhea, nausea and vomiting.   Genitourinary: Negative for difficulty urinating.   Musculoskeletal: Negative for myalgias, neck pain and neck stiffness.   Skin: Negative for rash.   Neurological: Negative for headaches.       Physical Exam   BP: (!) 145/65  Pulse: 87  Temp: 99.3  F (37.4  C)  Resp: 18  Weight: 74.8 kg (165 lb)  SpO2: 98 %    Physical Exam  GEN: Awake, alert, and cooperative. No acute distress  HENT: Normocephalic.  Wearing facemask.  EYES: EOM intact. Conjunctiva clear.   PULM: Normal effort.   NEURO: Normal speech. Following commands.  Answering questions and interacting appropriately.   EXT: No gross deformity.   INT:  Normal color.        ED Course        Procedures           Critical Care time:  none               No results found for this or any previous visit (from the past 24 hour(s)).    Medications - No data to display    Assessments & Plan (with Medical Decision Making)   24 year old male with past medical history of syncope (remote), with 1 day of cough who presents for SARS CoV-2 testing at request of employer.  On arrival to Emergency Department, vital signs were blood pressure 145/67, temperature of 99.3, heart rate of 87, respiratory rate 18, SPO2 of 98% on room air.  Exam was conducted via video conference.  Patient was alert and oriented and answering questions appropriately.  Will able to sit up on edge of bed and  manipulate iPad to facilitate video conference.  No signs of respiratory distress, speaking in full sentences.  Given his appearance and no obvious signs of respiratory distress reassuring vital signs, I do not feel significant work-up necessary today, as will not likely .  Will test for SARS CoV-2 and plan for discharge.  I did discuss the limitations of the testing and CDC guidelines for self-isolation.  He was provided written information upon discharge for same.  He expressed agreement understanding of plan and was discharged in stable condition.  He understands that he will be contacted for positive results and can follow-up with my chart.  If his symptoms persist he should follow-up with his primary care provider later this week.  If symptoms worsen develops new or concerning symptoms should return to the emergency department for further evaluation and treatment.    I have reviewed the nursing notes.         New Prescriptions    No medications on file       Final diagnoses:   Suspected COVID-19 virus infection     Kemar Newton MD    7/28/2020   [facility] EMERGENCY DEPARTMENT    Disclaimer: This note consists of words and symbols derived from keyboarding and dictation using voice recognition software.  As a result, there may be errors that have gone undetected.  Please consider this when interpreting information found in this note.             Kemar Newton MD  07/28/20 9311

## 2020-07-29 ENCOUNTER — TELEPHONE (OUTPATIENT)
Dept: EMERGENCY MEDICINE | Facility: CLINIC | Age: 25
End: 2020-07-29

## 2020-07-29 LAB
SARS-COV-2 RNA SPEC QL NAA+PROBE: NOT DETECTED
SPECIMEN SOURCE: NORMAL

## 2020-07-29 NOTE — TELEPHONE ENCOUNTER
Coronavirus (COVID-19) Notification     Reason for call  Patient requesting results     Lab Result    Lab test 2019-nCoV rRt-PCR in process        RN Recommendations/Instructions per [facility]  Continue quarantine and following instructions until you receive the results     Please Contact your PCP clinic or return to the Emergency department if your:    Symptoms worsen or other concerning symptom's.     Patient informed that if test for COVID19 is POSITIVE,  you will receive a call typically within 48 hours from the test date (date lab collected).  If NEGATIVE result, you will receive a letter in the mail or [place].      [RN/LPN Name]  Alejandra Christiansen RN  [facility] RN  Lung Nodule and ED Lab Result RN  Epic pool (ED late result f/u RN): P [id number]  [facility] INCIDENTAL RADIOLOGY F/U NURSES: P [id number]  # [phone]

## 2020-11-23 ENCOUNTER — HOSPITAL ENCOUNTER (EMERGENCY)
Facility: CLINIC | Age: 25
Discharge: HOME | End: 2020-11-23
Attending: NURSE PRACTITIONER | Admitting: NURSE PRACTITIONER
Payer: OTHER GOVERNMENT

## 2020-11-23 VITALS
OXYGEN SATURATION: 99 % | RESPIRATION RATE: 16 BRPM | HEART RATE: 60 BPM | BODY MASS INDEX: 25.09 KG/M2 | SYSTOLIC BLOOD PRESSURE: 134 MMHG | WEIGHT: 165 LBS | TEMPERATURE: 99 F | DIASTOLIC BLOOD PRESSURE: 86 MMHG

## 2020-11-23 DIAGNOSIS — Z20.822 ENCOUNTER FOR LABORATORY TESTING FOR COVID-19 VIRUS: ICD-10-CM

## 2020-11-23 PROCEDURE — G0463 HOSPITAL OUTPT CLINIC VISIT: HCPCS | Performed by: NURSE PRACTITIONER

## 2020-11-23 PROCEDURE — 99213 OFFICE O/P EST LOW 20 MIN: CPT | Performed by: NURSE PRACTITIONER

## 2020-11-23 PROCEDURE — C9803 HOPD COVID-19 SPEC COLLECT: HCPCS | Performed by: NURSE PRACTITIONER

## 2020-11-23 PROCEDURE — U0003 INFECTIOUS AGENT DETECTION BY NUCLEIC ACID (DNA OR RNA); SEVERE ACUTE RESPIRATORY SYNDROME CORONAVIRUS 2 (SARS-COV-2) (CORONAVIRUS DISEASE [COVID-19]), AMPLIFIED PROBE TECHNIQUE, MAKING USE OF HIGH THROUGHPUT TECHNOLOGIES AS DESCRIBED BY CMS-2020-01-R: HCPCS | Performed by: NURSE PRACTITIONER

## 2020-11-23 ASSESSMENT — ENCOUNTER SYMPTOMS
HEADACHES: 0
WEAKNESS: 0
LIGHT-HEADEDNESS: 0
TROUBLE SWALLOWING: 0
SINUS PRESSURE: 0
RHINORRHEA: 0
FEVER: 0
SORE THROAT: 0
COUGH: 0
CHILLS: 0
NAUSEA: 0
MYALGIAS: 0
SINUS PAIN: 0
ABDOMINAL PAIN: 0
EYE REDNESS: 0
FATIGUE: 0
VOMITING: 0
NUMBNESS: 0
SHORTNESS OF BREATH: 0
ARTHRALGIAS: 0
DIZZINESS: 0
JOINT SWELLING: 0
EYE DISCHARGE: 0

## 2020-11-23 NOTE — ED AVS SNAPSHOT
[facility] Emergency Dept  [address] [address]  Phone: [phone]  Fax: [phone]                                    Chet Ham   MRN: [id number]    Department: [facility] Emergency Dept   Date of Visit: 11/23/2020           After Visit Summary Signature Page    I have received my discharge instructions, and my questions have been answered. I have discussed any challenges I see with this plan with the nurse or doctor.    ..........................................................................................................................................  Patient/Patient Representative Signature      ..........................................................................................................................................  Patient Representative Print Name and Relationship to Patient    ..................................................               ................................................  Date                                   Time    ..........................................................................................................................................  Reviewed by Signature/Title    ...................................................              ..............................................  Date                                               Time          22EPIC Rev 08/18

## 2020-11-23 NOTE — DISCHARGE INSTRUCTIONS
"Discharge Instructions for COVID-19 Patients  You have--or may have--COVID-19. Please follow the instructions listed below.   If you have a weakened immune system, discuss with your doctor any other actions you need to take.  How can I protect others?  If you have symptoms (fever, cough, body aches or trouble breathing):  Stay home and away from others (self-isolate) until:  At least 10 days have passed since your symptoms started, And   You've had no fever--and no medicine that reduces fever--for 1 full day (24 hours), And    Your other symptoms have resolved (gotten better).  If you don't show symptoms, but testing showed that you have COVID-19:  Stay home and away from others (self-isolate). Follow the tips under \"How do I self-isolate?\" below for 10 days (20 days if you have a weak immune system).  You don't need to be retested for COVID-19 before going back to school or work. As long as you're fever-free and feeling better, you can go back to school, work and other activities after waiting the 10 or 20 days.   How do I self-isolate?  Stay in your own room, even for meals. Use your own bathroom if you can.  Stay away from others in your home. No hugging, kissing or shaking hands. No visitors.  Don't go to work, school or anywhere else.  Clean \"high touch\" surfaces often (doorknobs, counters, handles). Use household cleaning spray or wipes. You'll find a full list of  on the EPA website: www.epa.gov/pesticide-registration/list-n-disinfectants-use-against-sars-cov-2.  Cover your mouth and nose with a mask or other face covering to avoid spreading germs.  Wash your hands and face often. Use soap and water.  Caregivers in these groups are at risk for severe illness due to COVID-19:  People 65 years and older  People who live in a nursing home or long-term care facility  People with chronic disease (lung, heart, cancer, diabetes, kidney, liver, immunologic)  People who have a weakened immune system, including " those who:  Are in cancer treatment  Take medicine that weakens the immune system, such as corticosteroids  Had a bone marrow or organ transplant  Have an immune deficiency  Have poorly controlled HIV or AIDS  Are obese (body mass index of 40 or higher)  Smoke regularly  Caregivers should wear gloves while washing dishes, handling laundry and cleaning bedrooms and bathrooms.  Use caution when washing and drying laundry: Don't shake dirty laundry and use the warmest water setting that you can.  For more tips on managing your health at home, go to www.cdc.gov/coronavirus/2019-ncov/downloads/10Things.pdf.  How can I take care of myself at home?  Get lots of rest. Drink extra fluids (unless a doctor has told you not to).    Take Tylenol (acetaminophen) for fever or pain. If you have liver or kidney problems, ask your family doctor if it's okay to take Tylenol.     Adults can take either:  650 mg (two 325 mg pills) every 4 to 6 hours, or   1,000 mg (two 500 mg pills) every 8 hours as needed.  Note: Don't take more than 3,000 mg in one day. Acetaminophen is found in many medicines (both prescribed and over-the-counter medicines). Read all labels to be sure you don't take too much.   For children, check the Tylenol bottle for the right dose. The dose is based on the child's age or weight.  If you have other health problems (like cancer, heart failure, an organ transplant or severe kidney disease): Call your specialty clinic if you don't feel better in the next 2 days.    Know when to call 911. Emergency warning signs include:  Trouble breathing or shortness of breath  Pain or pressure in the chest that doesn't go away  Feeling confused like you haven't felt before, or not being able to wake up  Bluish-colored lips or face    Your doctor may have prescribed a blood thinner medicine. Follow their instructions.  Where can I get more information?   [facility] - About COVID-19: [email or web address]  [facility] - What to  Do If You're Sick: www.cdc.gov/coronavirus/2019-ncov/about/steps-when-sick.html  CDC - Ending Home Isolation: www.cdc.gov/coronavirus/2019-ncov/hcp/disposition-in-home-patients.html  CDC - Caring for Someone: www.cdc.gov/coronavirus/2019-ncov/if-you-are-sick/care-for-someone.html  [facility] - Interim Guidance for Hospital Discharge to Home: www.[facility].mn./diseases/coronavirus/hcp/hospdischarge.pdf  [facility] clinical trials (COVID-19 research studies): clinicalaffairs.[facility]-clinical-trials  Below are the COVID-19 hotlines at the [place] Department of Health ([facility]). Interpreters are available.  For health questions: Call 110-601-6736 or 1-627.581.4639 (7 a.m. to 7 p.m.)  For questions about schools and childcare: Call 027-244-1140 or 1-194.683.1199 (7 a.m. to 7 p.m.)    For informational purposes only. Not to replace the advice of your health care provider. Clinically reviewed by the Infection Prevention Team. Copyright   2020 [facility]. All rights reserved. [employer] 952101 - REV 08/04/20.

## 2020-11-23 NOTE — ED PROVIDER NOTES
History     Chief Complaint   Patient presents with     Covid 19 Testing     boss told him to come get tested, no symptoms     HPI  Chet Ham is a 25 year old male who presents the urgent care for COVID-19 testing.  Patient returned from a vacation to [place] last night and employer is requesting Covid test prior to return.  Patient is asymptomatic and with no complaints.  No known exposures.    Allergies:  No Known Allergies    Problem List:    Patient Active Problem List    Diagnosis Date Noted     Syncope, unspecified syncope type 2017     Priority: Medium     Sinus bradycardia 2017     Priority: Medium      Past Medical History:    History reviewed. No pertinent past medical history.    Past Surgical History:    History reviewed. No pertinent surgical history.    Family History:    Family History   Problem Relation Age of Onset     Diabetes Mother      Diabetes Father      Coronary Artery Disease Maternal Grandfather         Multiple stents.     Brain Tumor Maternal Grandfather        Social History:  Marital Status:  Single [1]  Social History     Tobacco Use     Smoking status: Former Smoker     Packs/day: 0.50     Years: 8.00     Pack years: 4.00     Types: Cigarettes     Quit date: 3/2/2020     Years since quittin.7     Smokeless tobacco: Never Used   Substance Use Topics     Alcohol use: No     Comment: None as of lately.  Previous use.  17 visit.     Drug use: None      Medications:         cephALEXin (KEFLEX) 500 MG capsule       cyclobenzaprine (FLEXERIL) 10 MG tablet       ketorolac (TORADOL) 10 MG tablet       ondansetron (ZOFRAN ODT) 4 MG ODT tab       valACYclovir (VALTREX) 1000 mg tablet      Review of Systems   Constitutional: Negative for chills, fatigue and fever.   HENT: Negative for congestion, ear discharge, ear pain, rhinorrhea, sinus pressure, sinus pain, sore throat and trouble swallowing.    Eyes: Negative for discharge and redness.   Respiratory: Negative  for cough and shortness of breath.    Cardiovascular: Negative for chest pain.   Gastrointestinal: Negative for abdominal pain, nausea and vomiting.   Musculoskeletal: Negative for arthralgias, joint swelling and myalgias.   Skin: Negative for rash.   Neurological: Negative for dizziness, weakness, light-headedness, numbness and headaches.   All other systems reviewed and are negative.    Physical Exam   BP: 134/86  Pulse: 60  Temp: 99  F (37.2  C)  Resp: 16  Weight: 74.8 kg (165 lb)  SpO2: 99 %    Physical Exam  Visit was completed using real time virtual video/chat while patient was physically in the department. Patient is showing no signs of respiratory distress or acute compromise. Able to speak clearly and in full sentences without difficulty. Alert and oriented. Ambulatory to and from the room without difficulty.     ED Course        Procedures    No results found for this or any previous visit (from the past 24 hour(s)).    Medications - No data to display    Assessments & Plan (with Medical Decision Making)   Chet Ham is a 25 year old male who presents the urgent care for COVID-19 testing.  Patient returned from a vacation to [place] last night and employer is requesting Covid test prior to return.  Patient is asymptomatic and with no complaints. Did discuss with patient that symptoms from a recent exposure would likely not show for a few days.  Patient is vitally stable. COVID-19 swab pending, home isolation in the interim.  Return precautions reviewed, all questions answered. Patient is agreeable to plan of care and discharged in no acute distress.     I have reviewed the nursing notes.    I have reviewed the findings, diagnosis, plan and need for follow up with the patient.  New Prescriptions    No medications on file     Final diagnoses:   Encounter for laboratory testing for COVID-19 virus     11/23/2020   [facility] EMERGENCY DEPT     Laurel Fountain, APRN CNP  11/23/20 1340

## 2020-11-24 LAB
SARS-COV-2 RNA SPEC QL NAA+PROBE: NOT DETECTED
SPECIMEN SOURCE: NORMAL

## 2022-01-03 ENCOUNTER — HOSPITAL ENCOUNTER (EMERGENCY)
Facility: CLINIC | Age: 27
Discharge: HOME | End: 2022-01-03
Attending: PHYSICIAN ASSISTANT | Admitting: PHYSICIAN ASSISTANT
Payer: OTHER GOVERNMENT

## 2022-01-03 VITALS
SYSTOLIC BLOOD PRESSURE: 132 MMHG | TEMPERATURE: 97.6 F | RESPIRATION RATE: 16 BRPM | HEART RATE: 94 BPM | DIASTOLIC BLOOD PRESSURE: 79 MMHG | OXYGEN SATURATION: 98 %

## 2022-01-03 DIAGNOSIS — R21 RASH AND NONSPECIFIC SKIN ERUPTION: ICD-10-CM

## 2022-01-03 DIAGNOSIS — U07.1 INFECTION DUE TO 2019 NOVEL CORONAVIRUS: Primary | ICD-10-CM

## 2022-01-03 DIAGNOSIS — R11.0 NAUSEA: ICD-10-CM

## 2022-01-03 LAB
FLUAV RNA SPEC QL NAA+PROBE: NEGATIVE
FLUBV RNA RESP QL NAA+PROBE: NEGATIVE
SARS-COV-2 RNA RESP QL NAA+PROBE: POSITIVE

## 2022-01-03 PROCEDURE — C9803 HOPD COVID-19 SPEC COLLECT: HCPCS | Performed by: PHYSICIAN ASSISTANT

## 2022-01-03 PROCEDURE — 250N000011 HC RX IP 250 OP 636: Performed by: PHYSICIAN ASSISTANT

## 2022-01-03 PROCEDURE — 96372 THER/PROPH/DIAG INJ SC/IM: CPT | Performed by: PHYSICIAN ASSISTANT

## 2022-01-03 PROCEDURE — G0463 HOSPITAL OUTPT CLINIC VISIT: HCPCS | Performed by: PHYSICIAN ASSISTANT

## 2022-01-03 PROCEDURE — 87636 SARSCOV2 & INF A&B AMP PRB: CPT | Performed by: PHYSICIAN ASSISTANT

## 2022-01-03 PROCEDURE — 99214 OFFICE O/P EST MOD 30 MIN: CPT | Performed by: PHYSICIAN ASSISTANT

## 2022-01-03 RX ORDER — TRIAMCINOLONE ACETONIDE 1 MG/G
CREAM TOPICAL 2 TIMES DAILY
Qty: 80 G | Refills: 0 | Status: SHIPPED | OUTPATIENT
Start: 2022-01-03 | End: 2022-01-17

## 2022-01-03 RX ORDER — KETOROLAC TROMETHAMINE 30 MG/ML
30 INJECTION, SOLUTION INTRAMUSCULAR; INTRAVENOUS ONCE
Status: COMPLETED | OUTPATIENT
Start: 2022-01-03 | End: 2022-01-03

## 2022-01-03 RX ORDER — ONDANSETRON 4 MG/1
4 TABLET, ORALLY DISINTEGRATING ORAL EVERY 8 HOURS PRN
Qty: 10 TABLET | Refills: 0 | Status: SHIPPED | OUTPATIENT
Start: 2022-01-03 | End: 2022-01-06

## 2022-01-03 RX ADMIN — KETOROLAC TROMETHAMINE 30 MG: 30 INJECTION, SOLUTION INTRAMUSCULAR at 14:39

## 2022-01-03 ASSESSMENT — ENCOUNTER SYMPTOMS
NAUSEA: 1
CARDIOVASCULAR NEGATIVE: 1
HEADACHES: 1
STRIDOR: 0
FATIGUE: 1
FACIAL SWELLING: 0
COUGH: 1
CHEST TIGHTNESS: 0
WHEEZING: 0
DIAPHORESIS: 0
CHOKING: 0
SINUS PAIN: 0
APPETITE CHANGE: 0
SINUS PRESSURE: 0
ACTIVITY CHANGE: 0
SORE THROAT: 0
APNEA: 0
MYALGIAS: 1
UNEXPECTED WEIGHT CHANGE: 0
FEVER: 0
CHILLS: 0
SHORTNESS OF BREATH: 0

## 2022-01-03 ASSESSMENT — VISUAL ACUITY: OU: 1

## 2022-01-03 NOTE — Clinical Note
Chet Ham was seen and treated in our emergency department on 1/3/2022.  He may return to work on 01/10/2022.  Shon was seen and evaluated my clinic today.  Due to medical reasons I recommend he remain out of work until January 10, 2022.  Must remain out of work longer if symptoms have not resolved.     If you have any questions or concerns, please don't hesitate to call.      Hilario Terrell PA-C

## 2022-01-03 NOTE — ED PROVIDER NOTES
History     Chief Complaint   Patient presents with     Facial Pain     Covid 19 Testing     HPI  Chet Ham is a 26 year old male who presents with complaints of URI symptoms which began last night.  Associated symptoms include dry occasional cough,  headache, body aches and fatigue.  Really concerned about headache today, rates his headache pain 6/10.  The pain is felt as pressure behind the eyes.  The patient has been taking tylenol with no relief of symptoms. No concerns for breathing or swallowing, chest pain, shortness of breath, abdominal pain, joint pain or rashes, acute vision changes, fever or chills, constipation or diarrhea, or leg pain/swelling.  No numbness or tingling, normal strength or sensation in the face, head, neck or upper extremities.  Has myalgias but denies extreme pain when moving his neck.  Normal bowel and bladder function.  food and fluid intake. He has not been vaccinated for COVID-19 or influenza.  Works for a [employer] company, has chronic joint pain.  Denies use of alcohol, or street drugs.  Currently uses a vape pen.    Allergies:  No Known Allergies    Problem List:    Patient Active Problem List    Diagnosis Date Noted     Syncope, unspecified syncope type 2017     Priority: Medium     Sinus bradycardia 2017     Priority: Medium        Past Medical History:    No past medical history on file.    Past Surgical History:    No past surgical history on file.    Family History:    Family History   Problem Relation Age of Onset     Diabetes Mother      Diabetes Father      Coronary Artery Disease Maternal Grandfather         Multiple stents.     Brain Tumor Maternal Grandfather        Social History:  Marital Status:  Legally  [3]  Social History     Tobacco Use     Smoking status: Former Smoker     Packs/day: 0.50     Years: 8.00     Pack years: 4.00     Types: Cigarettes     Quit date: 3/2/2020     Years since quittin.8     Smokeless tobacco: Never Used    Substance Use Topics     Alcohol use: No     Comment: None as of lately.  Previous use.  5-24-17 visit.     Drug use: Not on file        Medications:    cephALEXin (KEFLEX) 500 MG capsule  cyclobenzaprine (FLEXERIL) 10 MG tablet  ketorolac (TORADOL) 10 MG tablet  ondansetron (ZOFRAN ODT) 4 MG ODT tab  triamcinolone (KENALOG) 0.1 % external cream  valACYclovir (VALTREX) 1000 mg tablet          Review of Systems   Constitutional: Positive for fatigue. Negative for activity change, appetite change, chills, diaphoresis, fever and unexpected weight change.   HENT: Negative.  Negative for congestion, facial swelling, postnasal drip, sinus pressure, sinus pain and sore throat.    Respiratory: Positive for cough. Negative for apnea, choking, chest tightness, shortness of breath, wheezing and stridor.    Cardiovascular: Negative.    Gastrointestinal: Positive for nausea.   Musculoskeletal: Positive for myalgias.   Skin: Negative.    Neurological: Positive for headaches.       Physical Exam   BP: 132/79  Pulse: 94  Temp: 97.6  F (36.4  C)  Resp: 16  SpO2: 98 %      Physical Exam  Constitutional:       General: He is not in acute distress.     Appearance: Normal appearance. He is not ill-appearing, toxic-appearing or diaphoretic.   HENT:      Head: Normocephalic and atraumatic.      Right Ear: Tympanic membrane, ear canal and external ear normal. There is no impacted cerumen.      Left Ear: Tympanic membrane, ear canal and external ear normal. There is no impacted cerumen.      Nose: Nose normal. No congestion or rhinorrhea.      Mouth/Throat:      Mouth: Mucous membranes are moist.      Pharynx: Oropharynx is clear. No oropharyngeal exudate or posterior oropharyngeal erythema.   Eyes:      General: Vision grossly intact. No visual field deficit.        Right eye: No discharge.         Left eye: No discharge.      Extraocular Movements: Extraocular movements intact.      Right eye: Normal extraocular motion and no nystagmus.       Left eye: Normal extraocular motion and no nystagmus.      Conjunctiva/sclera:      Right eye: Right conjunctiva is injected. No chemosis.     Left eye: Left conjunctiva is injected. No chemosis.     Pupils: Pupils are equal, round, and reactive to light. Pupils are equal.      Right eye: Pupil is round, reactive and not sluggish.      Left eye: Pupil is round, reactive and not sluggish.   Neck:      Trachea: Trachea and phonation normal.      Meningeal: Brudzinski's sign and Kernig's sign absent.   Cardiovascular:      Rate and Rhythm: Normal rate and regular rhythm.      Pulses: Normal pulses.      Heart sounds: Normal heart sounds. No murmur heard.      Pulmonary:      Effort: Pulmonary effort is normal. No respiratory distress.      Breath sounds: Normal breath sounds. No stridor. No wheezing, rhonchi or rales.   Chest:      Chest wall: No tenderness.   Musculoskeletal:         General: No swelling or tenderness. Normal range of motion.      Cervical back: Normal range of motion and neck supple. No edema, erythema, signs of trauma, rigidity, torticollis, tenderness or crepitus. No pain with movement, spinous process tenderness or muscular tenderness. Normal range of motion.   Lymphadenopathy:      Cervical: No cervical adenopathy.   Skin:     General: Skin is warm and dry.      Findings: Rash present. No erythema or laceration. Rash is macular and papular. Rash is not crusting, nodular, purpuric, pustular, scaling, urticarial or vesicular.      Comments: Multiple isolated patches with maculopapular lesions on the back and upper arms.  Random pattern, nonblanching.  No itching or pain.   Neurological:      General: No focal deficit present.      Mental Status: He is alert and oriented to person, place, and time.      Cranial Nerves: No cranial nerve deficit.      Sensory: No sensory deficit.      Motor: No weakness.      Coordination: Coordination normal.      Gait: Gait normal.   Psychiatric:         Mood  and Affect: Mood normal.         Behavior: Behavior normal.         Thought Content: Thought content normal.         Judgment: Judgment normal.         ED Course                 Procedures             No results found for this or any previous visit (from the past 24 hour(s)).    Medications   ketorolac (TORADOL) injection 30 mg (30 mg Intramuscular Given 1/3/22 1439)       Assessments & Plan (with Medical Decision Making)   The patient is a 26 year old male who presents with complaints of URI symptoms which began last night.  Associated symptoms include dry occasional cough,  headache, body aches and fatigue.    The patient currently has an infection due to 2019 novel coronavirus.  Provided the patient with a COVID-19 get well loop referral.  Recommended routine monitoring of oxygen saturation while at home.  I feel the patient's rash is consistent with COVID-19 today.      Tylenol and ibuprofen can be used at the same time for pain control because they work differently.     Take ibuprofen 400 mg every (4) hours or 600 mg every (6) hours for pain control (max 2400 mg per day)    Take acetaminophen 1000 mg every (6) hours for pain control (max 4000 mg per day).    Plan on taking acetaminophen in between doses of ibuprofen. For severe pain, you can take Tylenol and ibuprofen at the same time. Return to clinic or emergency room if pain persists or worsens.    Apply triamcinolone cream to affected areas twice per day for 14 days as needed.    Provided the patient with Zofran for symptomatic relief of nausea.    Discussed isolation precautions for COVID-19 such as remaining out of work for 5 days from the start of symptoms with day 0 being the first day of symptoms.  May return to work after 5 days if symptoms have resolved, no need for pain or fever reducing medications.  Need to wear a mask for the next 5 days while around others.  Also recommend repeat test for COVID-19 prior to returning to work.      Symptomatic  cares discussed including: pushing fluids, rest, OTC cold medication such as DayQuil/NyQuil for cough. Follow up with PCP if no improvement in 1 week. Seek urgent medical evaluation if there are new or worsening symptoms such as fever of 104 degrees F or greater, chest tightness, wheezing, facial pressure, severe headaches, trouble breathing, trouble swallowing, severe or worsening nausea/vomiting, or severe abdominal pain. Pt/guardian verbalized understanding and agrees with the treatment plan.        I have reviewed the nursing notes.    I have reviewed the findings, diagnosis, plan and need for follow up with the patient.      Discharge Medication List as of 1/3/2022  4:13 PM      START taking these medications    Details   triamcinolone (KENALOG) 0.1 % external cream Apply topically 2 times daily for 14 daysDisp-80 g, [id number]             Final diagnoses:   Rash and nonspecific skin eruption   Nausea   Infection due to 2019 novel coronavirus       1/3/2022   [facility] EMERGENCY DEPT     Hilario Terrell PA-C  01/04/22 2122

## 2022-01-03 NOTE — ED TRIAGE NOTES
Pt has sinus pressure, started yesterday.  Pt has hot and cold flashes as well.  Not vaccinated against COVID.

## 2022-01-03 NOTE — ED NOTES
Patient has facial pain  Feels like eyes are going to pop out of head started on Sunday night  Patient not able sleep    Fever and chills

## 2022-01-03 NOTE — DISCHARGE INSTRUCTIONS
Tylenol and ibuprofen can be used at the same time for pain control because they work differently.     Take ibuprofen 400 mg every (4) hours or 600 mg every (6) hours for pain control (max 2400 mg per day)    Take acetaminophen 1000 mg every (6) hours for pain control (max 4000 mg per day).    Plan on taking acetaminophen in between doses of ibuprofen. For severe pain, you can take Tylenol and ibuprofen at the same time. Return to clinic or emergency room if pain persists or worsens.    Apply triamcinolone cream to affected areas twice per day for 14 days as needed.      Recommend checking oxygen saturation at home with a fingertip pulse oximeter.

## 2022-03-15 ENCOUNTER — APPOINTMENT (OUTPATIENT)
Dept: CT IMAGING | Facility: CLINIC | Age: 27
End: 2022-03-15
Attending: EMERGENCY MEDICINE

## 2022-03-15 ENCOUNTER — APPOINTMENT (OUTPATIENT)
Dept: GENERAL RADIOLOGY | Facility: CLINIC | Age: 27
End: 2022-03-15
Attending: EMERGENCY MEDICINE

## 2022-03-15 ENCOUNTER — ANCILLARY PROCEDURE (OUTPATIENT)
Dept: ULTRASOUND IMAGING | Facility: CLINIC | Age: 27
End: 2022-03-15
Attending: EMERGENCY MEDICINE

## 2022-03-15 ENCOUNTER — HOSPITAL ENCOUNTER (EMERGENCY)
Facility: CLINIC | Age: 27
Discharge: HOME | End: 2022-03-15
Attending: EMERGENCY MEDICINE | Admitting: EMERGENCY MEDICINE

## 2022-03-15 VITALS
HEART RATE: 93 BPM | OXYGEN SATURATION: 98 % | DIASTOLIC BLOOD PRESSURE: 71 MMHG | BODY MASS INDEX: 25.01 KG/M2 | TEMPERATURE: 97.5 F | WEIGHT: 165 LBS | RESPIRATION RATE: 26 BRPM | SYSTOLIC BLOOD PRESSURE: 130 MMHG | HEIGHT: 68 IN

## 2022-03-15 DIAGNOSIS — S80.252A FOREIGN BODY OF LEFT KNEE: ICD-10-CM

## 2022-03-15 DIAGNOSIS — S81.002A OPEN KNEE WOUND, LEFT, INITIAL ENCOUNTER: ICD-10-CM
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
## 2022-03-15 DIAGNOSIS — S01.81XA FACIAL LACERATION, INITIAL ENCOUNTER: ICD-10-CM

## 2022-03-15 DIAGNOSIS — T07.XXXA ABRASIONS OF MULTIPLE SITES: ICD-10-CM

## 2022-03-15 LAB
ALBUMIN SERPL-MCNC: 4.2 G/DL (ref 3.4–5)
ALP SERPL-CCNC: 88 U/L (ref 40–150)
ALT SERPL W P-5'-P-CCNC: 55 U/L (ref 0–70)
ANION GAP SERPL CALCULATED.3IONS-SCNC: 2 MMOL/L (ref 3–14)
AST SERPL W P-5'-P-CCNC: 32 U/L (ref 0–45)
BASOPHILS # BLD AUTO: 0.1 10E3/UL (ref 0–0.2)
BASOPHILS NFR BLD AUTO: 1 %
BILIRUB SERPL-MCNC: 0.2 MG/DL (ref 0.2–1.3)
BUN SERPL-MCNC: 11 MG/DL (ref 7–30)
CALCIUM SERPL-MCNC: 9.1 MG/DL (ref 8.5–10.1)
CHLORIDE BLD-SCNC: 109 MMOL/L (ref 94–109)
CO2 SERPL-SCNC: 32 MMOL/L (ref 20–32)
CREAT SERPL-MCNC: 0.89 MG/DL (ref 0.66–1.25)
EOSINOPHIL # BLD AUTO: 0 10E3/UL (ref 0–0.7)
EOSINOPHIL NFR BLD AUTO: 0 %
ERYTHROCYTE [DISTWIDTH] IN BLOOD BY AUTOMATED COUNT: 12.8 % (ref 10–15)
GFR SERPL CREATININE-BSD FRML MDRD: >90 ML/MIN/1.73M2
GLUCOSE BLD-MCNC: 117 MG/DL (ref 70–99)
HCT VFR BLD AUTO: 45.9 % (ref 40–53)
HGB BLD-MCNC: 16.1 G/DL (ref 13.3–17.7)
HOLD SPECIMEN: NORMAL
IMM GRANULOCYTES # BLD: 0 10E3/UL
IMM GRANULOCYTES NFR BLD: 0 %
LIPASE SERPL-CCNC: 62 U/L (ref 73–393)
LYMPHOCYTES # BLD AUTO: 2.3 10E3/UL (ref 0.8–5.3)
LYMPHOCYTES NFR BLD AUTO: 30 %
MCH RBC QN AUTO: 29.7 PG (ref 26.5–33)
MCHC RBC AUTO-ENTMCNC: 35.1 G/DL (ref 31.5–36.5)
MCV RBC AUTO: 85 FL (ref 78–100)
MONOCYTES # BLD AUTO: 0.6 10E3/UL (ref 0–1.3)
MONOCYTES NFR BLD AUTO: 7 %
NEUTROPHILS # BLD AUTO: 4.9 10E3/UL (ref 1.6–8.3)
NEUTROPHILS NFR BLD AUTO: 62 %
NRBC # BLD AUTO: 0 10E3/UL
NRBC BLD AUTO-RTO: 0 /100
PLATELET # BLD AUTO: 270 10E3/UL (ref 150–450)
POTASSIUM BLD-SCNC: 3.8 MMOL/L (ref 3.4–5.3)
PROT SERPL-MCNC: 7.8 G/DL (ref 6.8–8.8)
RBC # BLD AUTO: 5.43 10E6/UL (ref 4.4–5.9)
SODIUM SERPL-SCNC: 143 MMOL/L (ref 133–144)
WBC # BLD AUTO: 7.9 10E3/UL (ref 4–11)

## 2022-03-15 PROCEDURE — 76705 ECHO EXAM OF ABDOMEN: CPT

## 2022-03-15 PROCEDURE — 72125 CT NECK SPINE W/O DYE: CPT

## 2022-03-15 PROCEDURE — 85025 COMPLETE CBC W/AUTO DIFF WBC: CPT | Performed by: EMERGENCY MEDICINE

## 2022-03-15 PROCEDURE — 76705 ECHO EXAM OF ABDOMEN: CPT | Mod: 26 | Performed by: EMERGENCY MEDICINE

## 2022-03-15 PROCEDURE — 80053 COMPREHEN METABOLIC PANEL: CPT | Performed by: EMERGENCY MEDICINE

## 2022-03-15 PROCEDURE — 99285 EMERGENCY DEPT VISIT HI MDM: CPT | Mod: 25

## 2022-03-15 PROCEDURE — 93308 TTE F-UP OR LMTD: CPT

## 2022-03-15 PROCEDURE — 70450 CT HEAD/BRAIN W/O DYE: CPT

## 2022-03-15 PROCEDURE — 99285 EMERGENCY DEPT VISIT HI MDM: CPT | Mod: 25 | Performed by: EMERGENCY MEDICINE

## 2022-03-15 PROCEDURE — 72170 X-RAY EXAM OF PELVIS: CPT

## 2022-03-15 PROCEDURE — 93308 TTE F-UP OR LMTD: CPT | Mod: 26 | Performed by: EMERGENCY MEDICINE

## 2022-03-15 PROCEDURE — 71046 X-RAY EXAM CHEST 2 VIEWS: CPT

## 2022-03-15 PROCEDURE — 83690 ASSAY OF LIPASE: CPT | Performed by: EMERGENCY MEDICINE

## 2022-03-15 PROCEDURE — 250N000009 HC RX 250: Performed by: EMERGENCY MEDICINE

## 2022-03-15 PROCEDURE — 250N000013 HC RX MED GY IP 250 OP 250 PS 637: Performed by: EMERGENCY MEDICINE

## 2022-03-15 PROCEDURE — 73700 CT LOWER EXTREMITY W/O DYE: CPT | Mod: LT

## 2022-03-15 PROCEDURE — 36415 COLL VENOUS BLD VENIPUNCTURE: CPT | Performed by: EMERGENCY MEDICINE

## 2022-03-15 RX ORDER — CEPHALEXIN 500 MG/1
500 CAPSULE ORAL 4 TIMES DAILY
Qty: 28 CAPSULE | Refills: 0 | Status: SHIPPED | OUTPATIENT
Start: 2022-03-15 | End: 2022-03-22

## 2022-03-15 RX ORDER — CEPHALEXIN 500 MG/1
500 CAPSULE ORAL ONCE
Status: COMPLETED | OUTPATIENT
Start: 2022-03-15 | End: 2022-03-15

## 2022-03-15 RX ORDER — LIDOCAINE HYDROCHLORIDE 20 MG/ML
JELLY TOPICAL ONCE
Status: COMPLETED | OUTPATIENT
Start: 2022-03-15 | End: 2022-03-15

## 2022-03-15 RX ADMIN — CEPHALEXIN 500 MG: 500 CAPSULE ORAL at 06:52

## 2022-03-15 RX ADMIN — LIDOCAINE HYDROCHLORIDE: 20 JELLY TOPICAL at 04:13

## 2022-03-15 NOTE — DISCHARGE INSTRUCTIONS
Please keep the wound clean and dry for 24-48 hours. The affected area should be kept elevated as much as possible.  After 24 hours, the dressing may be removed and the wound may be gently cleaned with warm water and soap.  You may apply petroleum based ointment as desired.  You should return in 5 days to your primary care physician or the emergency department for suture removal.     Any time the skin is damaged, scar formation is unavoidable. You can minimize the scar by following the above instructions and preventing infection. The scar will continue to evolve for at least 1 year. Final appearance of the scar will not be known until at least that time. Please apply sun screen to the scar before any sun exposure for the first year as sunburn or even tanning may cause the scar to become discolored and lead to poor cosmetic outcomes. If after 1 year, you are unhappy with the appearance of the scar you should seek follow-up with the appropriate health care professional to discuss further options.    You should return to the emergency department or your primary care physician immediately if you develop warmth, redness, swelling, drainage from the wound, or have fevers.    Keep your abrasions clean and dry.  When your dressings become soiled remove them and you may wash the skin with warm soapy water.  Allow skin to dry then apply bacitracin ointment followed by Vaseline gauze and then sterile bandages.    There is a foreign body under the skin in your left knee that we are unable to remove the emergency department.  A referral for orthopedics has been placed.  Take Keflex as prescribed and follow-up with [facility].    If your symptoms worsen or you develop new or concerning symptoms, please return to the Emergency Department for further evaluation and treatment.
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
## 2023-11-30 ENCOUNTER — HOSPITAL ENCOUNTER (EMERGENCY)
Facility: CLINIC | Age: 28
Discharge: HOME | End: 2023-12-01
Attending: STUDENT IN AN ORGANIZED HEALTH CARE EDUCATION/TRAINING PROGRAM | Admitting: STUDENT IN AN ORGANIZED HEALTH CARE EDUCATION/TRAINING PROGRAM
Payer: OTHER MISCELLANEOUS

## 2023-11-30 ENCOUNTER — APPOINTMENT (OUTPATIENT)
Dept: GENERAL RADIOLOGY | Facility: CLINIC | Age: 28
End: 2023-11-30
Attending: STUDENT IN AN ORGANIZED HEALTH CARE EDUCATION/TRAINING PROGRAM
Payer: OTHER MISCELLANEOUS

## 2023-11-30 ENCOUNTER — APPOINTMENT (OUTPATIENT)
Dept: CT IMAGING | Facility: CLINIC | Age: 28
End: 2023-11-30
Attending: STUDENT IN AN ORGANIZED HEALTH CARE EDUCATION/TRAINING PROGRAM
Payer: OTHER MISCELLANEOUS
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
## 2023-11-30 DIAGNOSIS — M25.511 ACUTE PAIN OF RIGHT SHOULDER: ICD-10-CM

## 2023-11-30 PROCEDURE — 250N000013 HC RX MED GY IP 250 OP 250 PS 637: Performed by: STUDENT IN AN ORGANIZED HEALTH CARE EDUCATION/TRAINING PROGRAM

## 2023-11-30 PROCEDURE — 72125 CT NECK SPINE W/O DYE: CPT

## 2023-11-30 PROCEDURE — 99284 EMERGENCY DEPT VISIT MOD MDM: CPT | Mod: 25 | Performed by: STUDENT IN AN ORGANIZED HEALTH CARE EDUCATION/TRAINING PROGRAM

## 2023-11-30 PROCEDURE — 76705 ECHO EXAM OF ABDOMEN: CPT

## 2023-11-30 PROCEDURE — 99284 EMERGENCY DEPT VISIT MOD MDM: CPT | Mod: 25

## 2023-11-30 PROCEDURE — 76705 ECHO EXAM OF ABDOMEN: CPT | Mod: 26 | Performed by: STUDENT IN AN ORGANIZED HEALTH CARE EDUCATION/TRAINING PROGRAM

## 2023-11-30 PROCEDURE — 72128 CT CHEST SPINE W/O DYE: CPT

## 2023-11-30 PROCEDURE — 71250 CT THORAX DX C-: CPT

## 2023-11-30 PROCEDURE — 70450 CT HEAD/BRAIN W/O DYE: CPT

## 2023-11-30 PROCEDURE — 73030 X-RAY EXAM OF SHOULDER: CPT | Mod: RT

## 2023-11-30 RX ORDER — OXYCODONE HYDROCHLORIDE 5 MG/1
5 TABLET ORAL ONCE
Status: COMPLETED | OUTPATIENT
Start: 2023-11-30 | End: 2023-11-30

## 2023-11-30 RX ADMIN — OXYCODONE HYDROCHLORIDE 5 MG: 5 TABLET ORAL at 23:59

## 2023-11-30 RX ADMIN — IBUPROFEN 600 MG: 400 TABLET ORAL at 23:59

## 2023-11-30 RX ADMIN — OXYCODONE HYDROCHLORIDE 5 MG: 5 TABLET ORAL at 22:40

## 2023-11-30 ASSESSMENT — ACTIVITIES OF DAILY LIVING (ADL): ADLS_ACUITY_SCORE: 35

## 2023-12-01 VITALS
DIASTOLIC BLOOD PRESSURE: 64 MMHG | TEMPERATURE: 98.1 F | RESPIRATION RATE: 18 BRPM | HEART RATE: 71 BPM | SYSTOLIC BLOOD PRESSURE: 112 MMHG | HEIGHT: 68 IN | BODY MASS INDEX: 28.04 KG/M2 | WEIGHT: 185 LBS | OXYGEN SATURATION: 96 %
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
## 2024-05-21 ENCOUNTER — OFFICE VISIT (OUTPATIENT)
Dept: URGENT CARE | Facility: URGENT CARE | Age: 29
End: 2024-05-21

## 2024-05-21 VITALS
BODY MASS INDEX: 26.76 KG/M2 | OXYGEN SATURATION: 99 % | DIASTOLIC BLOOD PRESSURE: 81 MMHG | TEMPERATURE: 97.4 F | WEIGHT: 176 LBS | SYSTOLIC BLOOD PRESSURE: 145 MMHG | HEART RATE: 62 BPM
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
## 2024-05-21 PROCEDURE — 99203 OFFICE O/P NEW LOW 30 MIN: CPT | Performed by: FAMILY MEDICINE

## 2024-05-21 NOTE — PROGRESS NOTES
SUBJECTIVE: Chet Ham is a 28 year old male presenting with a chief complaint of dog bite left shoulder.  Onset of symptoms was hour(s) ago.  Current and Associated symptoms: none  Treatment measures tried include cleaned wound.  Predisposing factors include atwork.  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  No Known Allergies  Social History     Tobacco Use    Smoking status: Former     Current packs/day: 0.00     Average packs/day: 0.5 packs/day for 8.0 years (4.0 ttl pk-yrs)     Types: Cigarettes     Start date: 3/2/2012     Quit date: 3/2/2020     Years since quittin.2    Smokeless tobacco: Never   Substance Use Topics    Alcohol use: No     Comment: None as of lately.  Previous use.  17 visit.       ROS:  SKIN: no rash  GI: no vomiting    OBJECTIVE:  BP (!) 145/81   Pulse 62   Temp 97.4  F (36.3  C) (Tympanic)   Wt 79.8 kg (176 lb)   SpO2 99%   BMI 26.76 kg/m  GENERAL APPEARANCE: healthy, alert and no distress  SKIN: 2cm lac left shoulder, no bleeding      ICD-10-CM    1. Dog bite, initial encounter  W54.0XXA amoxicillin-clavulanate (AUGMENTIN) 875-125 MG tablet        Clean and steri strip  TDAP UTD  Fluids/Rest, f/u if worse/not any better